# Patient Record
Sex: FEMALE | Race: WHITE | Employment: UNEMPLOYED | URBAN - METROPOLITAN AREA
[De-identification: names, ages, dates, MRNs, and addresses within clinical notes are randomized per-mention and may not be internally consistent; named-entity substitution may affect disease eponyms.]

---

## 2017-02-22 ENCOUNTER — OFFICE VISIT (OUTPATIENT)
Dept: PRIMARY CARE CLINIC | Age: 47
End: 2017-02-22

## 2017-02-22 VITALS
SYSTOLIC BLOOD PRESSURE: 106 MMHG | HEIGHT: 63 IN | WEIGHT: 179 LBS | HEART RATE: 88 BPM | TEMPERATURE: 98.3 F | DIASTOLIC BLOOD PRESSURE: 68 MMHG | RESPIRATION RATE: 12 BRPM | BODY MASS INDEX: 31.71 KG/M2

## 2017-02-22 DIAGNOSIS — R63.5 WEIGHT GAIN: ICD-10-CM

## 2017-02-22 DIAGNOSIS — R63.5 WEIGHT GAIN: Primary | ICD-10-CM

## 2017-02-22 DIAGNOSIS — E78.5 DYSLIPIDEMIA: ICD-10-CM

## 2017-02-22 LAB
T4 FREE: 1.04 NG/DL (ref 0.93–1.7)
T4 TOTAL: 5.9 UG/DL (ref 4.5–11.7)
TSH SERPL DL<=0.05 MIU/L-ACNC: 3.33 UIU/ML (ref 0.27–4.2)

## 2017-02-22 PROCEDURE — G8484 FLU IMMUNIZE NO ADMIN: HCPCS | Performed by: FAMILY MEDICINE

## 2017-02-22 PROCEDURE — G8427 DOCREV CUR MEDS BY ELIG CLIN: HCPCS | Performed by: FAMILY MEDICINE

## 2017-02-22 PROCEDURE — G8417 CALC BMI ABV UP PARAM F/U: HCPCS | Performed by: FAMILY MEDICINE

## 2017-02-22 PROCEDURE — 99213 OFFICE O/P EST LOW 20 MIN: CPT | Performed by: FAMILY MEDICINE

## 2017-02-22 PROCEDURE — 1036F TOBACCO NON-USER: CPT | Performed by: FAMILY MEDICINE

## 2017-02-22 RX ORDER — PHENTERMINE HYDROCHLORIDE 37.5 MG/1
37.5 TABLET ORAL
Qty: 30 TABLET | Refills: 0 | Status: SHIPPED | OUTPATIENT
Start: 2017-02-22 | End: 2017-03-22 | Stop reason: SDUPTHER

## 2017-02-22 ASSESSMENT — ENCOUNTER SYMPTOMS
FACIAL SWELLING: 0
EYE REDNESS: 0
WHEEZING: 0
COLOR CHANGE: 0
CHEST TIGHTNESS: 0
EYE PAIN: 0
PHOTOPHOBIA: 0
CHOKING: 0
APNEA: 0
STRIDOR: 0
CONSTIPATION: 0
DIARRHEA: 0
ABDOMINAL PAIN: 0
NAUSEA: 0
EYE DISCHARGE: 0

## 2017-03-22 ENCOUNTER — OFFICE VISIT (OUTPATIENT)
Dept: PRIMARY CARE CLINIC | Age: 47
End: 2017-03-22

## 2017-03-22 VITALS
RESPIRATION RATE: 12 BRPM | WEIGHT: 172 LBS | HEART RATE: 68 BPM | TEMPERATURE: 98.3 F | DIASTOLIC BLOOD PRESSURE: 72 MMHG | SYSTOLIC BLOOD PRESSURE: 114 MMHG | HEIGHT: 63 IN | BODY MASS INDEX: 30.48 KG/M2

## 2017-03-22 DIAGNOSIS — E78.5 DYSLIPIDEMIA: Primary | ICD-10-CM

## 2017-03-22 DIAGNOSIS — G47.00 INSOMNIA, UNSPECIFIED TYPE: ICD-10-CM

## 2017-03-22 DIAGNOSIS — M79.7 FIBROMYALGIA: Chronic | ICD-10-CM

## 2017-03-22 DIAGNOSIS — R63.5 WEIGHT GAIN: ICD-10-CM

## 2017-03-22 PROCEDURE — G8484 FLU IMMUNIZE NO ADMIN: HCPCS | Performed by: FAMILY MEDICINE

## 2017-03-22 PROCEDURE — G8417 CALC BMI ABV UP PARAM F/U: HCPCS | Performed by: FAMILY MEDICINE

## 2017-03-22 PROCEDURE — 1036F TOBACCO NON-USER: CPT | Performed by: FAMILY MEDICINE

## 2017-03-22 PROCEDURE — G8427 DOCREV CUR MEDS BY ELIG CLIN: HCPCS | Performed by: FAMILY MEDICINE

## 2017-03-22 PROCEDURE — 99213 OFFICE O/P EST LOW 20 MIN: CPT | Performed by: FAMILY MEDICINE

## 2017-03-22 RX ORDER — PHENTERMINE HYDROCHLORIDE 37.5 MG/1
37.5 TABLET ORAL
Qty: 30 TABLET | Refills: 0 | Status: SHIPPED | OUTPATIENT
Start: 2017-03-22 | End: 2017-04-24 | Stop reason: SDUPTHER

## 2017-03-22 ASSESSMENT — ENCOUNTER SYMPTOMS: ABDOMINAL PAIN: 0

## 2017-04-04 ENCOUNTER — TELEPHONE (OUTPATIENT)
Dept: PRIMARY CARE CLINIC | Age: 47
End: 2017-04-04

## 2017-04-24 ENCOUNTER — OFFICE VISIT (OUTPATIENT)
Dept: PRIMARY CARE CLINIC | Age: 47
End: 2017-04-24

## 2017-04-24 VITALS
TEMPERATURE: 97 F | RESPIRATION RATE: 18 BRPM | DIASTOLIC BLOOD PRESSURE: 76 MMHG | SYSTOLIC BLOOD PRESSURE: 122 MMHG | HEART RATE: 90 BPM | OXYGEN SATURATION: 99 % | BODY MASS INDEX: 29.73 KG/M2 | HEIGHT: 63 IN | WEIGHT: 167.8 LBS

## 2017-04-24 DIAGNOSIS — E78.5 DYSLIPIDEMIA: Primary | ICD-10-CM

## 2017-04-24 DIAGNOSIS — R63.5 WEIGHT GAIN: ICD-10-CM

## 2017-04-24 DIAGNOSIS — J30.1 ALLERGIC RHINITIS DUE TO POLLEN, UNSPECIFIED RHINITIS SEASONALITY: ICD-10-CM

## 2017-04-24 PROCEDURE — G8427 DOCREV CUR MEDS BY ELIG CLIN: HCPCS | Performed by: FAMILY MEDICINE

## 2017-04-24 PROCEDURE — G8417 CALC BMI ABV UP PARAM F/U: HCPCS | Performed by: FAMILY MEDICINE

## 2017-04-24 PROCEDURE — 1036F TOBACCO NON-USER: CPT | Performed by: FAMILY MEDICINE

## 2017-04-24 PROCEDURE — 99213 OFFICE O/P EST LOW 20 MIN: CPT | Performed by: FAMILY MEDICINE

## 2017-04-24 RX ORDER — PHENTERMINE HYDROCHLORIDE 37.5 MG/1
37.5 TABLET ORAL
Qty: 30 TABLET | Refills: 0 | Status: SHIPPED | OUTPATIENT
Start: 2017-04-24 | End: 2017-05-24

## 2017-04-24 ASSESSMENT — ENCOUNTER SYMPTOMS
PHOTOPHOBIA: 0
EYE PAIN: 0
NAUSEA: 0
CHEST TIGHTNESS: 0
ABDOMINAL PAIN: 0
CHOKING: 0
WHEEZING: 0
EYE DISCHARGE: 0
DIARRHEA: 0
FACIAL SWELLING: 0
EYE REDNESS: 0
CONSTIPATION: 0
APNEA: 0
STRIDOR: 0
COLOR CHANGE: 0

## 2017-05-23 ENCOUNTER — OFFICE VISIT (OUTPATIENT)
Dept: PRIMARY CARE CLINIC | Age: 47
End: 2017-05-23

## 2017-05-23 VITALS
HEART RATE: 72 BPM | BODY MASS INDEX: 29.06 KG/M2 | WEIGHT: 164 LBS | OXYGEN SATURATION: 98 % | SYSTOLIC BLOOD PRESSURE: 114 MMHG | DIASTOLIC BLOOD PRESSURE: 74 MMHG | TEMPERATURE: 98.3 F | RESPIRATION RATE: 14 BRPM | HEIGHT: 63 IN

## 2017-05-23 DIAGNOSIS — E66.9 OBESITY, UNSPECIFIED OBESITY SEVERITY, UNSPECIFIED OBESITY TYPE: ICD-10-CM

## 2017-05-23 DIAGNOSIS — E78.5 DYSLIPIDEMIA: Primary | ICD-10-CM

## 2017-05-23 PROCEDURE — G8417 CALC BMI ABV UP PARAM F/U: HCPCS | Performed by: FAMILY MEDICINE

## 2017-05-23 PROCEDURE — 1036F TOBACCO NON-USER: CPT | Performed by: FAMILY MEDICINE

## 2017-05-23 PROCEDURE — 99213 OFFICE O/P EST LOW 20 MIN: CPT | Performed by: FAMILY MEDICINE

## 2017-05-23 PROCEDURE — G8427 DOCREV CUR MEDS BY ELIG CLIN: HCPCS | Performed by: FAMILY MEDICINE

## 2017-05-23 RX ORDER — ADAPALENE AND BENZOYL PEROXIDE .1; 2.5 G/100G; G/100G
1 GEL TOPICAL DAILY
Qty: 1 TUBE | Refills: 6 | Status: SHIPPED | OUTPATIENT
Start: 2017-05-23 | End: 2017-08-07

## 2017-05-23 RX ORDER — TOPIRAMATE 50 MG/1
50 TABLET, FILM COATED ORAL DAILY
Qty: 60 TABLET | Refills: 3 | Status: SHIPPED | OUTPATIENT
Start: 2017-05-23 | End: 2017-07-10 | Stop reason: SINTOL

## 2017-05-23 ASSESSMENT — PATIENT HEALTH QUESTIONNAIRE - PHQ9
SUM OF ALL RESPONSES TO PHQ QUESTIONS 1-9: 0
1. LITTLE INTEREST OR PLEASURE IN DOING THINGS: 0
2. FEELING DOWN, DEPRESSED OR HOPELESS: 0
SUM OF ALL RESPONSES TO PHQ9 QUESTIONS 1 & 2: 0

## 2017-07-10 ENCOUNTER — OFFICE VISIT (OUTPATIENT)
Dept: PRIMARY CARE CLINIC | Age: 47
End: 2017-07-10

## 2017-07-10 VITALS
RESPIRATION RATE: 17 BRPM | DIASTOLIC BLOOD PRESSURE: 74 MMHG | WEIGHT: 170 LBS | SYSTOLIC BLOOD PRESSURE: 104 MMHG | BODY MASS INDEX: 30.12 KG/M2 | OXYGEN SATURATION: 98 % | HEIGHT: 63 IN | TEMPERATURE: 97.6 F | HEART RATE: 83 BPM

## 2017-07-10 DIAGNOSIS — E78.5 DYSLIPIDEMIA: ICD-10-CM

## 2017-07-10 DIAGNOSIS — G43.809 OTHER TYPE OF MIGRAINE: Primary | Chronic | ICD-10-CM

## 2017-07-10 DIAGNOSIS — R63.4 WEIGHT LOSS: ICD-10-CM

## 2017-07-10 DIAGNOSIS — T50.905A MEDICATION SIDE EFFECT, INITIAL ENCOUNTER: ICD-10-CM

## 2017-07-10 PROCEDURE — G8417 CALC BMI ABV UP PARAM F/U: HCPCS | Performed by: FAMILY MEDICINE

## 2017-07-10 PROCEDURE — 99213 OFFICE O/P EST LOW 20 MIN: CPT | Performed by: FAMILY MEDICINE

## 2017-07-10 PROCEDURE — G8427 DOCREV CUR MEDS BY ELIG CLIN: HCPCS | Performed by: FAMILY MEDICINE

## 2017-07-10 PROCEDURE — 1036F TOBACCO NON-USER: CPT | Performed by: FAMILY MEDICINE

## 2017-07-10 RX ORDER — LORAZEPAM 0.5 MG/1
0.5 TABLET ORAL
COMMUNITY
End: 2017-07-10

## 2017-07-10 RX ORDER — BUTALBITAL, ACETAMINOPHEN AND CAFFEINE 50; 325; 40 MG/1; MG/1; MG/1
1 TABLET ORAL
COMMUNITY
End: 2017-07-10

## 2017-07-10 ASSESSMENT — ENCOUNTER SYMPTOMS
VOMITING: 0
STRIDOR: 0
SHORTNESS OF BREATH: 1
DIFFICULTY BREATHING: 1
GASTROINTESTINAL NEGATIVE: 1
EYE WATERING: 0
WHEEZING: 0
EYE ITCHING: 0
TROUBLE SWALLOWING: 0
COUGH: 0
EYE REDNESS: 0
PHOTOPHOBIA: 0
DIARRHEA: 0
HYPERVENTILATION: 0
ALLERGIC REACTION: 1
BACK PAIN: 0
EYES NEGATIVE: 1
ABDOMINAL PAIN: 0
GLOBUS SENSATION: 0
CONSTIPATION: 0

## 2017-08-07 ENCOUNTER — OFFICE VISIT (OUTPATIENT)
Dept: PRIMARY CARE CLINIC | Age: 47
End: 2017-08-07

## 2017-08-07 VITALS
TEMPERATURE: 97 F | WEIGHT: 171 LBS | HEART RATE: 76 BPM | RESPIRATION RATE: 12 BRPM | DIASTOLIC BLOOD PRESSURE: 72 MMHG | BODY MASS INDEX: 30.3 KG/M2 | SYSTOLIC BLOOD PRESSURE: 106 MMHG | HEIGHT: 63 IN

## 2017-08-07 DIAGNOSIS — E78.5 DYSLIPIDEMIA: ICD-10-CM

## 2017-08-07 DIAGNOSIS — M79.7 FIBROMYALGIA: Chronic | ICD-10-CM

## 2017-08-07 DIAGNOSIS — R07.9 CHEST PAIN, UNSPECIFIED TYPE: Primary | ICD-10-CM

## 2017-08-07 PROCEDURE — G8427 DOCREV CUR MEDS BY ELIG CLIN: HCPCS | Performed by: FAMILY MEDICINE

## 2017-08-07 PROCEDURE — 93000 ELECTROCARDIOGRAM COMPLETE: CPT | Performed by: FAMILY MEDICINE

## 2017-08-07 PROCEDURE — G8417 CALC BMI ABV UP PARAM F/U: HCPCS | Performed by: FAMILY MEDICINE

## 2017-08-07 PROCEDURE — 1036F TOBACCO NON-USER: CPT | Performed by: FAMILY MEDICINE

## 2017-08-07 PROCEDURE — 99214 OFFICE O/P EST MOD 30 MIN: CPT | Performed by: FAMILY MEDICINE

## 2017-08-07 ASSESSMENT — ENCOUNTER SYMPTOMS
EYE REDNESS: 0
NAUSEA: 0
APNEA: 0
COUGH: 0
HYPERVENTILATION: 0
GLOBUS SENSATION: 1
FACIAL SWELLING: 0
EYE DISCHARGE: 0
VOMITING: 0
STRIDOR: 0
EYE ITCHING: 0
DIARRHEA: 0
SHORTNESS OF BREATH: 1
PHOTOPHOBIA: 0
DIFFICULTY BREATHING: 1
COLOR CHANGE: 0
ABDOMINAL PAIN: 0
WHEEZING: 0
ALLERGIC REACTION: 1
TROUBLE SWALLOWING: 1
EYE PAIN: 0
CHOKING: 0
CONSTIPATION: 0
CHEST TIGHTNESS: 1
EYE WATERING: 0

## 2017-08-10 DIAGNOSIS — R07.89 OTHER CHEST PAIN: Primary | ICD-10-CM

## 2017-12-19 ENCOUNTER — OFFICE VISIT (OUTPATIENT)
Dept: PRIMARY CARE CLINIC | Age: 47
End: 2017-12-19

## 2017-12-19 VITALS
TEMPERATURE: 98.9 F | BODY MASS INDEX: 32.07 KG/M2 | DIASTOLIC BLOOD PRESSURE: 76 MMHG | SYSTOLIC BLOOD PRESSURE: 114 MMHG | HEIGHT: 63 IN | WEIGHT: 181 LBS | HEART RATE: 96 BPM | RESPIRATION RATE: 20 BRPM

## 2017-12-19 DIAGNOSIS — H69.83 DYSFUNCTION OF BOTH EUSTACHIAN TUBES: ICD-10-CM

## 2017-12-19 DIAGNOSIS — J01.00 ACUTE NON-RECURRENT MAXILLARY SINUSITIS: Primary | ICD-10-CM

## 2017-12-19 DIAGNOSIS — R68.83 CHILLS: ICD-10-CM

## 2017-12-19 DIAGNOSIS — J40 BRONCHITIS: ICD-10-CM

## 2017-12-19 LAB
INFLUENZA A ANTIBODY: NORMAL
INFLUENZA B ANTIBODY: NORMAL

## 2017-12-19 PROCEDURE — G8427 DOCREV CUR MEDS BY ELIG CLIN: HCPCS | Performed by: FAMILY MEDICINE

## 2017-12-19 PROCEDURE — 99213 OFFICE O/P EST LOW 20 MIN: CPT | Performed by: FAMILY MEDICINE

## 2017-12-19 PROCEDURE — G8417 CALC BMI ABV UP PARAM F/U: HCPCS | Performed by: FAMILY MEDICINE

## 2017-12-19 PROCEDURE — G8484 FLU IMMUNIZE NO ADMIN: HCPCS | Performed by: FAMILY MEDICINE

## 2017-12-19 PROCEDURE — 87804 INFLUENZA ASSAY W/OPTIC: CPT | Performed by: FAMILY MEDICINE

## 2017-12-19 PROCEDURE — 1036F TOBACCO NON-USER: CPT | Performed by: FAMILY MEDICINE

## 2017-12-19 RX ORDER — AZITHROMYCIN 250 MG/1
TABLET, FILM COATED ORAL
Qty: 1 PACKET | Refills: 1 | Status: SHIPPED | OUTPATIENT
Start: 2017-12-19 | End: 2017-12-29

## 2017-12-19 RX ORDER — CEFUROXIME AXETIL 500 MG/1
500 TABLET ORAL 2 TIMES DAILY
Qty: 20 TABLET | Refills: 0 | Status: CANCELLED | OUTPATIENT
Start: 2017-12-19 | End: 2017-12-29

## 2017-12-19 ASSESSMENT — ENCOUNTER SYMPTOMS
SORE THROAT: 1
COUGH: 1
SHORTNESS OF BREATH: 1
RHINORRHEA: 1
HEMOPTYSIS: 0
DIARRHEA: 0
ABDOMINAL PAIN: 0
WHEEZING: 1
PHOTOPHOBIA: 0
EYES NEGATIVE: 1
CONSTIPATION: 0
GASTROINTESTINAL NEGATIVE: 1
HEARTBURN: 0
EYE ITCHING: 0
BACK PAIN: 0
EYE REDNESS: 0

## 2018-01-11 ENCOUNTER — OFFICE VISIT (OUTPATIENT)
Dept: PRIMARY CARE CLINIC | Age: 48
End: 2018-01-11

## 2018-01-11 VITALS
OXYGEN SATURATION: 96 % | HEART RATE: 89 BPM | SYSTOLIC BLOOD PRESSURE: 114 MMHG | BODY MASS INDEX: 32.2 KG/M2 | WEIGHT: 181.7 LBS | DIASTOLIC BLOOD PRESSURE: 70 MMHG | TEMPERATURE: 99 F | HEIGHT: 63 IN | RESPIRATION RATE: 14 BRPM

## 2018-01-11 DIAGNOSIS — J00 OTHER ACUTE RHINITIS: ICD-10-CM

## 2018-01-11 DIAGNOSIS — J20.9 ACUTE BRONCHITIS, UNSPECIFIED ORGANISM: Primary | ICD-10-CM

## 2018-01-11 PROCEDURE — 99213 OFFICE O/P EST LOW 20 MIN: CPT | Performed by: INTERNAL MEDICINE

## 2018-01-11 RX ORDER — DOXYCYCLINE HYCLATE 100 MG
100 TABLET ORAL 2 TIMES DAILY
Qty: 20 TABLET | Refills: 0 | Status: SHIPPED | OUTPATIENT
Start: 2018-01-11 | End: 2018-01-21

## 2018-01-11 RX ORDER — FEXOFENADINE HCL AND PSEUDOEPHEDRINE HCI 60; 120 MG/1; MG/1
1 TABLET, EXTENDED RELEASE ORAL 2 TIMES DAILY
Qty: 60 TABLET | Refills: 1 | Status: SHIPPED | OUTPATIENT
Start: 2018-01-11

## 2018-01-11 RX ORDER — PROMETHAZINE HYDROCHLORIDE AND CODEINE PHOSPHATE 6.25; 1 MG/5ML; MG/5ML
5 SYRUP ORAL EVERY 4 HOURS PRN
Qty: 118 ML | Refills: 0 | Status: SHIPPED | OUTPATIENT
Start: 2018-01-11 | End: 2018-01-18

## 2018-01-11 RX ORDER — IPRATROPIUM BROMIDE 42 UG/1
2 SPRAY, METERED NASAL 3 TIMES DAILY
Qty: 1 BOTTLE | Refills: 2 | Status: SHIPPED | OUTPATIENT
Start: 2018-01-11 | End: 2019-01-11

## 2018-01-16 ASSESSMENT — ENCOUNTER SYMPTOMS
FACIAL SWELLING: 0
PHOTOPHOBIA: 0
RHINORRHEA: 1
APNEA: 0
HEARTBURN: 0
CHOKING: 0
BLOOD IN STOOL: 0
ABDOMINAL DISTENTION: 0
COUGH: 1
SORE THROAT: 1

## 2018-03-12 ENCOUNTER — HOSPITAL ENCOUNTER (OUTPATIENT)
Dept: MRI IMAGING | Age: 48
Discharge: HOME | End: 2018-03-14
Payer: COMMERCIAL

## 2018-03-12 DIAGNOSIS — M51.26 HNP (HERNIATED NUCLEUS PULPOSUS), LUMBAR: ICD-10-CM

## 2018-03-12 DIAGNOSIS — M47.817 LUMBOSACRAL SPONDYLOSIS WITHOUT MYELOPATHY: ICD-10-CM

## 2018-03-12 PROCEDURE — 72148 MRI LUMBAR SPINE W/O DYE: CPT

## 2018-03-28 ENCOUNTER — OFFICE VISIT (OUTPATIENT)
Dept: PRIMARY CARE CLINIC | Age: 48
End: 2018-03-28
Payer: COMMERCIAL

## 2018-03-28 VITALS
SYSTOLIC BLOOD PRESSURE: 102 MMHG | DIASTOLIC BLOOD PRESSURE: 72 MMHG | RESPIRATION RATE: 16 BRPM | OXYGEN SATURATION: 99 % | BODY MASS INDEX: 32.43 KG/M2 | HEIGHT: 63 IN | WEIGHT: 183 LBS | HEART RATE: 100 BPM | TEMPERATURE: 97.8 F

## 2018-03-28 DIAGNOSIS — R68.89 FLU-LIKE SYMPTOMS: ICD-10-CM

## 2018-03-28 DIAGNOSIS — J01.00 ACUTE NON-RECURRENT MAXILLARY SINUSITIS: Primary | ICD-10-CM

## 2018-03-28 DIAGNOSIS — J02.9 SORE THROAT: ICD-10-CM

## 2018-03-28 LAB
INFLUENZA A ANTIBODY: NORMAL
INFLUENZA B ANTIBODY: NORMAL
S PYO AG THROAT QL: NORMAL

## 2018-03-28 PROCEDURE — 87880 STREP A ASSAY W/OPTIC: CPT | Performed by: PHYSICIAN ASSISTANT

## 2018-03-28 PROCEDURE — 87804 INFLUENZA ASSAY W/OPTIC: CPT | Performed by: PHYSICIAN ASSISTANT

## 2018-03-28 PROCEDURE — 99213 OFFICE O/P EST LOW 20 MIN: CPT | Performed by: PHYSICIAN ASSISTANT

## 2018-03-28 RX ORDER — AZITHROMYCIN 250 MG/1
TABLET, FILM COATED ORAL
Qty: 1 PACKET | Refills: 0 | Status: SHIPPED | OUTPATIENT
Start: 2018-03-28 | End: 2018-05-20

## 2018-03-28 ASSESSMENT — ENCOUNTER SYMPTOMS
SWOLLEN GLANDS: 0
NAUSEA: 0
COUGH: 0
SORE THROAT: 1

## 2018-03-28 NOTE — PROGRESS NOTES
Subjective     Denisse Ferrer 49 y.o. female presents 3/28/18 with   Chief Complaint   Patient presents with    Nasal Congestion     Pt is here for nasal congestion, sore throat, glands swollen, slight fever ( 99 deg F), chills, body aches, right ear pain x 1 day       Pharyngitis   This is a new problem. The current episode started in the past 7 days. The problem occurs constantly. The problem has been unchanged. Associated symptoms include a fever, myalgias and a sore throat. Pertinent negatives include no chills, congestion, coughing, diaphoresis, fatigue, headaches, joint swelling, nausea, neck pain, rash or swollen glands. Nothing aggravates the symptoms. Treatments tried: Theraflu, Sudafed. The treatment provided no relief.        Reviewed the following history:    Past Medical History:   Diagnosis Date    Allergic rhinitis 2012    Allergic rhinitis 2012    Anxiety 3339    Biliary colic     Bulging discs     L4-5    Cellulitis 2012    Dyslipidemia 2017    ETD (eustachian tube dysfunction) 2012    Fibromyalgia     Gallbladder attack, GB Thickening 2015    GERD (gastroesophageal reflux disease)     HNP (herniated nucleus pulposus), lumbar 2012    LBP (low back pain) 2012    LBP (low back pain) 2012    Leg wound, left 2012    Lumbar radiculopathy 2012    Lumbar radiculopathy 2012    Migraine     OCD (obsessive compulsive disorder)     PMDD (premenstrual dysphoric disorder)     Sinusitis 2014     Past Surgical History:   Procedure Laterality Date    CARDIAC CATHETERIZATION      normal     SECTION  9530,7740    SHOULDER SURGERY Left     X 3    TONSILLECTOMY      TUBAL LIGATION      REVERSED     TUBAL LIGATION      2009    UPPER GASTROINTESTINAL ENDOSCOPY  2012     Family History   Problem Relation Age of Onset    Heart Disease Father     High Blood Pressure Father     High Blood  POCT Influenza A/B    POCT rapid strep A       Orders Placed This Encounter   Medications    azithromycin (ZITHROMAX) 250 MG tablet     Sig: Take 2 tabs (500 mg) on Day 1, and take 1 tab (250 mg) on days 2 through 5. Dispense:  1 packet     Refill:  0       Reviewed with the patient: current clinical status, medications, activities and diet. Side effects, adverse effects of the medication prescribed today, as well as treatment plan and result expectations have been discussed with the patient who expresses understanding and desires to proceed. Close follow up to evaluate treatment results and for coordination of care. I have reviewed the patient's medical history in detail and updated the computerized patient record. Return if symptoms worsen or fail to improve, for follow up with PCP.     YEISON Nguyen

## 2018-05-20 ENCOUNTER — OFFICE VISIT (OUTPATIENT)
Dept: PRIMARY CARE CLINIC | Age: 48
End: 2018-05-20
Payer: COMMERCIAL

## 2018-05-20 VITALS
DIASTOLIC BLOOD PRESSURE: 78 MMHG | SYSTOLIC BLOOD PRESSURE: 118 MMHG | WEIGHT: 181.8 LBS | TEMPERATURE: 98.4 F | OXYGEN SATURATION: 98 % | HEIGHT: 63 IN | BODY MASS INDEX: 32.21 KG/M2 | RESPIRATION RATE: 16 BRPM | HEART RATE: 101 BPM

## 2018-05-20 DIAGNOSIS — R10.12 LEFT UPPER QUADRANT PAIN: Primary | ICD-10-CM

## 2018-05-20 PROCEDURE — 99211 OFF/OP EST MAY X REQ PHY/QHP: CPT | Performed by: NURSE PRACTITIONER

## 2018-05-20 ASSESSMENT — ENCOUNTER SYMPTOMS
FLATUS: 0
NAUSEA: 0
BELCHING: 0
HEMATOCHEZIA: 0
ABDOMINAL PAIN: 1
CONSTIPATION: 0
DIARRHEA: 0
VOMITING: 0

## 2018-05-21 ENCOUNTER — OFFICE VISIT (OUTPATIENT)
Dept: PRIMARY CARE CLINIC | Age: 48
End: 2018-05-21
Payer: COMMERCIAL

## 2018-05-21 VITALS
HEIGHT: 63 IN | BODY MASS INDEX: 32.21 KG/M2 | TEMPERATURE: 98.3 F | RESPIRATION RATE: 16 BRPM | OXYGEN SATURATION: 98 % | HEART RATE: 91 BPM | SYSTOLIC BLOOD PRESSURE: 122 MMHG | WEIGHT: 181.8 LBS | DIASTOLIC BLOOD PRESSURE: 80 MMHG

## 2018-05-21 DIAGNOSIS — G62.9 NEUROPATHY: ICD-10-CM

## 2018-05-21 DIAGNOSIS — B02.9 HERPES ZOSTER WITHOUT COMPLICATION: ICD-10-CM

## 2018-05-21 DIAGNOSIS — R20.8 SENSORY ABNORMALITY OF THORACIC DERMATOME DISTRIBUTION: Primary | ICD-10-CM

## 2018-05-21 DIAGNOSIS — F43.9 STRESS: ICD-10-CM

## 2018-05-21 DIAGNOSIS — R10.12 ABDOMINAL PAIN, LEFT UPPER QUADRANT: ICD-10-CM

## 2018-05-21 PROCEDURE — 99214 OFFICE O/P EST MOD 30 MIN: CPT | Performed by: FAMILY MEDICINE

## 2018-05-21 RX ORDER — PREDNISONE 10 MG/1
TABLET ORAL
Qty: 20 TABLET | Refills: 0 | Status: SHIPPED | OUTPATIENT
Start: 2018-05-21 | End: 2018-05-31

## 2018-05-21 RX ORDER — VALACYCLOVIR HYDROCHLORIDE 1 G/1
1000 TABLET, FILM COATED ORAL 3 TIMES DAILY
Qty: 30 TABLET | Refills: 0 | Status: SHIPPED | OUTPATIENT
Start: 2018-05-21 | End: 2018-10-01 | Stop reason: ALTCHOICE

## 2018-05-21 ASSESSMENT — ENCOUNTER SYMPTOMS
HEMATOCHEZIA: 0
PHOTOPHOBIA: 0
EYE DISCHARGE: 0
CONSTIPATION: 0
ABDOMINAL PAIN: 0
ROS SKIN COMMENTS: SENSITIVITY
EYE PAIN: 0
BELCHING: 0
CHEST TIGHTNESS: 0
APNEA: 0
FACIAL SWELLING: 0
CHOKING: 0
NAUSEA: 0
FLATUS: 0
EYE REDNESS: 0
BACK PAIN: 1
COLOR CHANGE: 1
STRIDOR: 0
DIARRHEA: 0
WHEEZING: 0

## 2018-05-31 ENCOUNTER — OFFICE VISIT (OUTPATIENT)
Dept: PRIMARY CARE CLINIC | Age: 48
End: 2018-05-31
Payer: COMMERCIAL

## 2018-05-31 VITALS
TEMPERATURE: 99.2 F | SYSTOLIC BLOOD PRESSURE: 124 MMHG | WEIGHT: 180.6 LBS | OXYGEN SATURATION: 99 % | DIASTOLIC BLOOD PRESSURE: 80 MMHG | BODY MASS INDEX: 32 KG/M2 | HEART RATE: 88 BPM | RESPIRATION RATE: 16 BRPM | HEIGHT: 63 IN

## 2018-05-31 DIAGNOSIS — J02.9 SORE THROAT: Primary | ICD-10-CM

## 2018-05-31 LAB — S PYO AG THROAT QL: NORMAL

## 2018-05-31 PROCEDURE — 87880 STREP A ASSAY W/OPTIC: CPT | Performed by: INTERNAL MEDICINE

## 2018-05-31 PROCEDURE — 99213 OFFICE O/P EST LOW 20 MIN: CPT | Performed by: INTERNAL MEDICINE

## 2018-05-31 RX ORDER — AZITHROMYCIN 250 MG/1
TABLET, FILM COATED ORAL
Qty: 1 PACKET | Refills: 1 | Status: SHIPPED | OUTPATIENT
Start: 2018-05-31 | End: 2018-09-09

## 2018-05-31 ASSESSMENT — PATIENT HEALTH QUESTIONNAIRE - PHQ9
SUM OF ALL RESPONSES TO PHQ9 QUESTIONS 1 & 2: 2
SUM OF ALL RESPONSES TO PHQ QUESTIONS 1-9: 2
2. FEELING DOWN, DEPRESSED OR HOPELESS: 1
1. LITTLE INTEREST OR PLEASURE IN DOING THINGS: 1

## 2018-06-05 ASSESSMENT — ENCOUNTER SYMPTOMS
BLOOD IN STOOL: 0
FACIAL SWELLING: 0
ABDOMINAL DISTENTION: 0
CHOKING: 0
COUGH: 0
APNEA: 0
PHOTOPHOBIA: 0
ABDOMINAL PAIN: 0

## 2018-09-09 ENCOUNTER — OFFICE VISIT (OUTPATIENT)
Dept: PRIMARY CARE CLINIC | Age: 48
End: 2018-09-09
Payer: COMMERCIAL

## 2018-09-09 VITALS
BODY MASS INDEX: 32.07 KG/M2 | HEART RATE: 87 BPM | RESPIRATION RATE: 14 BRPM | SYSTOLIC BLOOD PRESSURE: 124 MMHG | TEMPERATURE: 98.5 F | WEIGHT: 181 LBS | OXYGEN SATURATION: 97 % | HEIGHT: 63 IN | DIASTOLIC BLOOD PRESSURE: 82 MMHG

## 2018-09-09 DIAGNOSIS — H60.331 ACUTE SWIMMER'S EAR OF RIGHT SIDE: ICD-10-CM

## 2018-09-09 DIAGNOSIS — H65.92 MIDDLE EAR EFFUSION, LEFT: ICD-10-CM

## 2018-09-09 DIAGNOSIS — H66.001 ACUTE SUPPURATIVE OTITIS MEDIA OF RIGHT EAR WITHOUT SPONTANEOUS RUPTURE OF TYMPANIC MEMBRANE, RECURRENCE NOT SPECIFIED: Primary | ICD-10-CM

## 2018-09-09 PROCEDURE — 99213 OFFICE O/P EST LOW 20 MIN: CPT | Performed by: NURSE PRACTITIONER

## 2018-09-09 RX ORDER — AMOXICILLIN AND CLAVULANATE POTASSIUM 875; 125 MG/1; MG/1
1 TABLET, FILM COATED ORAL 2 TIMES DAILY
Qty: 20 TABLET | Refills: 0 | Status: SHIPPED | OUTPATIENT
Start: 2018-09-09 | End: 2018-09-19

## 2018-09-09 ASSESSMENT — ENCOUNTER SYMPTOMS
SORE THROAT: 0
COUGH: 0
ABDOMINAL PAIN: 0
RHINORRHEA: 0
SHORTNESS OF BREATH: 0

## 2018-09-09 NOTE — PROGRESS NOTES
Subjective:      Patient ID: Marcella Crowell is a 51 y.o. female who presents today for:  Chief Complaint   Patient presents with    Otalgia     Patient is here for right ear pain with jaw pain x 1 day. She was splashed with [facility] water over Labor day. Tylenol is not working. Otalgia    There is pain in the right ear. This is a new problem. The current episode started yesterday. The problem occurs constantly. The problem has been unchanged. There has been no fever. Associated symptoms include ear discharge. Pertinent negatives include no abdominal pain, coughing, headaches, hearing loss, neck pain, rhinorrhea or sore throat. Associated symptoms comments: Right jaw, ear tender  . She has tried acetaminophen (flonase) for the symptoms. The treatment provided no relief. Her past medical history is significant for a chronic ear infection. There is no history of hearing loss or a tympanostomy tube.        Past Medical History:   Diagnosis Date    Allergic rhinitis 2012    Allergic rhinitis 2012    Anxiety     Biliary colic 7813    Bulging discs     L4-5    Cellulitis 2012    Dyslipidemia 2017    ETD (eustachian tube dysfunction) 2012    Fibromyalgia     Gallbladder attack, GB Thickening 2015    GERD (gastroesophageal reflux disease)     HNP (herniated nucleus pulposus), lumbar 2012    LBP (low back pain) 2012    LBP (low back pain) 2012    Leg wound, left 2012    Lumbar radiculopathy 2012    Lumbar radiculopathy 2012    Migraine     OCD (obsessive compulsive disorder)     PMDD (premenstrual dysphoric disorder)     Sinusitis 2014     Past Surgical History:   Procedure Laterality Date    CARDIAC CATHETERIZATION      normal     SECTION  2443,3922    SHOULDER SURGERY Left     X 3    TONSILLECTOMY      TUBAL LIGATION      REVERSED     TUBAL LIGATION          UPPER GASTROINTESTINAL

## 2018-09-09 NOTE — PATIENT INSTRUCTIONS
closely for changes in your health, and be sure to contact your doctor if:    · You have new or worse symptoms.     · You are not getting better after taking an antibiotic for 2 days. Where can you learn more? Go to [email or web address] and sign in to your [employer] account. Enter O830 in the Healthiest You box to learn more about \"Ear Infection (Otitis Media): Care Instructions. \"     If you do not have an account, please click on the \"Sign Up Now\" link. Current as of: May 12, 2017  Content Version: 11.7  © 7023-2369 [employer]. Care instructions adapted under license by [facility] ([facility]). If you have questions about a medical condition or this instruction, always ask your healthcare professional. [address] any warranty or liability for your use of this information.

## 2018-10-01 ENCOUNTER — OFFICE VISIT (OUTPATIENT)
Dept: PRIMARY CARE CLINIC | Age: 48
End: 2018-10-01
Payer: COMMERCIAL

## 2018-10-01 VITALS
DIASTOLIC BLOOD PRESSURE: 72 MMHG | BODY MASS INDEX: 31.71 KG/M2 | OXYGEN SATURATION: 98 % | HEART RATE: 110 BPM | WEIGHT: 179 LBS | HEIGHT: 63 IN | SYSTOLIC BLOOD PRESSURE: 118 MMHG | TEMPERATURE: 98.6 F | RESPIRATION RATE: 18 BRPM

## 2018-10-01 DIAGNOSIS — K21.9 GASTROESOPHAGEAL REFLUX DISEASE WITHOUT ESOPHAGITIS: Chronic | ICD-10-CM

## 2018-10-01 DIAGNOSIS — J01.00 ACUTE NON-RECURRENT MAXILLARY SINUSITIS: Primary | ICD-10-CM

## 2018-10-01 PROCEDURE — 99214 OFFICE O/P EST MOD 30 MIN: CPT | Performed by: FAMILY MEDICINE

## 2018-10-01 RX ORDER — AZITHROMYCIN 250 MG/1
TABLET, FILM COATED ORAL
Qty: 1 PACKET | Refills: 1 | Status: SHIPPED | OUTPATIENT
Start: 2018-10-01 | End: 2018-12-26

## 2018-10-01 RX ORDER — FLUTICASONE PROPIONATE 50 MCG
1 SPRAY, SUSPENSION (ML) NASAL DAILY
Qty: 1 BOTTLE | Refills: 1 | Status: SHIPPED | OUTPATIENT
Start: 2018-10-01 | End: 2019-02-10

## 2018-10-01 ASSESSMENT — ENCOUNTER SYMPTOMS
SHORTNESS OF BREATH: 0
WHEEZING: 0
HOARSE VOICE: 0
COUGH: 1
EYE ITCHING: 0
EYE REDNESS: 0
SORE THROAT: 0
BACK PAIN: 0
DIARRHEA: 0
SINUS PRESSURE: 1
EYES NEGATIVE: 1
SWOLLEN GLANDS: 0
CONSTIPATION: 0
ABDOMINAL PAIN: 0
GASTROINTESTINAL NEGATIVE: 1
PHOTOPHOBIA: 0

## 2018-10-24 ENCOUNTER — NURSE ONLY (OUTPATIENT)
Dept: PRIMARY CARE CLINIC | Age: 48
End: 2018-10-24
Payer: COMMERCIAL

## 2018-10-24 DIAGNOSIS — Z23 NEED FOR INFLUENZA VACCINATION: Primary | ICD-10-CM

## 2018-10-24 PROCEDURE — 90688 IIV4 VACCINE SPLT 0.5 ML IM: CPT | Performed by: PHYSICIAN ASSISTANT

## 2018-10-24 PROCEDURE — 90471 IMMUNIZATION ADMIN: CPT | Performed by: PHYSICIAN ASSISTANT

## 2018-12-26 ENCOUNTER — OFFICE VISIT (OUTPATIENT)
Dept: PRIMARY CARE CLINIC | Age: 48
End: 2018-12-26
Payer: COMMERCIAL

## 2018-12-26 VITALS
HEART RATE: 77 BPM | OXYGEN SATURATION: 98 % | WEIGHT: 174 LBS | TEMPERATURE: 97.5 F | RESPIRATION RATE: 16 BRPM | SYSTOLIC BLOOD PRESSURE: 124 MMHG | DIASTOLIC BLOOD PRESSURE: 82 MMHG | BODY MASS INDEX: 30.83 KG/M2 | HEIGHT: 63 IN

## 2018-12-26 DIAGNOSIS — G43.809 OTHER MIGRAINE WITHOUT STATUS MIGRAINOSUS, NOT INTRACTABLE: ICD-10-CM

## 2018-12-26 DIAGNOSIS — J06.9 ACUTE URI: Primary | ICD-10-CM

## 2018-12-26 DIAGNOSIS — R05.9 COUGH: ICD-10-CM

## 2018-12-26 PROCEDURE — 99213 OFFICE O/P EST LOW 20 MIN: CPT | Performed by: PHYSICIAN ASSISTANT

## 2018-12-26 RX ORDER — AZITHROMYCIN 250 MG/1
TABLET, FILM COATED ORAL
Qty: 1 PACKET | Refills: 0 | Status: SHIPPED | OUTPATIENT
Start: 2018-12-26 | End: 2019-02-10 | Stop reason: ALTCHOICE

## 2018-12-26 RX ORDER — BUTALBITAL, ACETAMINOPHEN AND CAFFEINE 50; 325; 40 MG/1; MG/1; MG/1
1-2 TABLET ORAL EVERY 6 HOURS PRN
Qty: 30 TABLET | Refills: 3 | Status: SHIPPED | OUTPATIENT
Start: 2018-12-26

## 2018-12-26 ASSESSMENT — ENCOUNTER SYMPTOMS
SORE THROAT: 1
COUGH: 1
SHORTNESS OF BREATH: 0
HOARSE VOICE: 0
SINUS PRESSURE: 1
SWOLLEN GLANDS: 0

## 2018-12-26 NOTE — PROGRESS NOTES
Neurological: Negative for headaches. Objective    Vitals:    12/26/18 1737   BP: 124/82   Pulse: 77   Resp: 16   Temp: 97.5 °F (36.4 °C)   TempSrc: Tympanic   SpO2: 98%   Weight: 174 lb (78.9 kg)   Height: 5' 3\" (1.6 m)       Physical Exam   Constitutional: She appears well-developed and well-nourished. No distress. HENT:   Head: Normocephalic and atraumatic. Right Ear: A middle ear effusion (clear fluid) is present. Left Ear: A middle ear effusion (clear fluid) is present. Nose: Mucosal edema and rhinorrhea present. Mouth/Throat: Posterior oropharyngeal erythema present. No oropharyngeal exudate. Eyes: Conjunctivae are normal. Right eye exhibits no discharge. Left eye exhibits no discharge. No scleral icterus. Cardiovascular: Normal rate, regular rhythm and normal heart sounds. Exam reveals no gallop and no friction rub. No murmur heard. Pulmonary/Chest: Breath sounds normal. No stridor. No respiratory distress. She has no wheezes. She has no rales. She exhibits no tenderness. Lymphadenopathy:     She has no cervical adenopathy. Skin: Skin is warm and dry. No rash noted. She is not diaphoretic. No erythema. No pallor. Vitals reviewed. Assessment and Plan      ICD-10-CM    1. Acute URI J06.9 azithromycin (ZITHROMAX) 250 MG tablet   2. Cough R05    3. Other migraine without status migrainosus, not intractable G43.809 butalbital-acetaminophen-caffeine (FIORICET, ESGIC) -40 MG per tablet       No orders of the defined types were placed in this encounter. Orders Placed This Encounter   Medications    azithromycin (ZITHROMAX) 250 MG tablet     Sig: Take 2 tabs (500 mg) on Day 1, and take 1 tab (250 mg) on days 2 through 5.      Dispense:  1 packet     Refill:  0    butalbital-acetaminophen-caffeine (FIORICET, ESGIC) -40 MG per tablet     Sig: Take 1-2 tablets by mouth every 6 hours as needed for Headaches     Dispense:  30 tablet     Refill:  3     Fioricet was

## 2019-02-10 ENCOUNTER — OFFICE VISIT (OUTPATIENT)
Dept: PRIMARY CARE CLINIC | Age: 49
End: 2019-02-10
Payer: COMMERCIAL

## 2019-02-10 VITALS
RESPIRATION RATE: 14 BRPM | DIASTOLIC BLOOD PRESSURE: 78 MMHG | TEMPERATURE: 97.4 F | HEIGHT: 63 IN | SYSTOLIC BLOOD PRESSURE: 112 MMHG | BODY MASS INDEX: 30.99 KG/M2 | OXYGEN SATURATION: 99 % | HEART RATE: 91 BPM | WEIGHT: 174.9 LBS

## 2019-02-10 DIAGNOSIS — J01.00 ACUTE NON-RECURRENT MAXILLARY SINUSITIS: Primary | ICD-10-CM

## 2019-02-10 PROCEDURE — 99213 OFFICE O/P EST LOW 20 MIN: CPT | Performed by: PHYSICIAN ASSISTANT

## 2019-02-10 RX ORDER — FLUTICASONE PROPIONATE 50 MCG
2 SPRAY, SUSPENSION (ML) NASAL NIGHTLY
Qty: 1 BOTTLE | Refills: 3 | Status: SHIPPED | OUTPATIENT
Start: 2019-02-10

## 2019-02-10 RX ORDER — AMOXICILLIN AND CLAVULANATE POTASSIUM 875; 125 MG/1; MG/1
1 TABLET, FILM COATED ORAL 2 TIMES DAILY
Qty: 20 TABLET | Refills: 0 | Status: SHIPPED | OUTPATIENT
Start: 2019-02-10 | End: 2019-02-20

## 2019-02-10 ASSESSMENT — ENCOUNTER SYMPTOMS
SORE THROAT: 0
SHORTNESS OF BREATH: 0
COUGH: 1
SWOLLEN GLANDS: 0
SINUS PRESSURE: 1
HOARSE VOICE: 0

## 2019-05-07 ENCOUNTER — TELEPHONE (OUTPATIENT)
Dept: PRIMARY CARE CLINIC | Age: 49
End: 2019-05-07

## 2021-07-07 ENCOUNTER — HOSPITAL ENCOUNTER (OUTPATIENT)
Dept: WOMENS IMAGING | Age: 51
Discharge: HOME | End: 2021-07-09
Payer: COMMERCIAL

## 2021-07-07 DIAGNOSIS — Z12.31 ENCOUNTER FOR SCREENING MAMMOGRAM FOR BREAST CANCER: ICD-10-CM

## 2021-07-07 PROCEDURE — 77063 BREAST TOMOSYNTHESIS BI: CPT

## 2023-03-30 ENCOUNTER — OFFICE VISIT (OUTPATIENT)
Dept: PRIMARY CARE | Facility: CLINIC | Age: 53
End: 2023-03-30
Payer: COMMERCIAL

## 2023-03-30 VITALS
RESPIRATION RATE: 16 BRPM | OXYGEN SATURATION: 98 % | SYSTOLIC BLOOD PRESSURE: 122 MMHG | HEART RATE: 68 BPM | HEIGHT: 63 IN | DIASTOLIC BLOOD PRESSURE: 64 MMHG | WEIGHT: 178 LBS | BODY MASS INDEX: 31.54 KG/M2 | TEMPERATURE: 98 F

## 2023-03-30 DIAGNOSIS — R05.8 RECURRENT PRODUCTIVE COUGH: ICD-10-CM

## 2023-03-30 DIAGNOSIS — J02.9 SORE THROAT: Primary | ICD-10-CM

## 2023-03-30 LAB — POC RAPID STREP: NEGATIVE

## 2023-03-30 PROCEDURE — 99214 OFFICE O/P EST MOD 30 MIN: CPT | Performed by: NURSE PRACTITIONER

## 2023-03-30 PROCEDURE — 87880 STREP A ASSAY W/OPTIC: CPT | Performed by: NURSE PRACTITIONER

## 2023-03-30 RX ORDER — ROSUVASTATIN CALCIUM 20 MG/1
20 TABLET, COATED ORAL DAILY
COMMUNITY
End: 2023-09-05

## 2023-03-30 RX ORDER — BUPROPION HYDROCHLORIDE 300 MG/1
300 TABLET ORAL DAILY
COMMUNITY
End: 2023-08-11 | Stop reason: SDUPTHER

## 2023-03-30 RX ORDER — CEFDINIR 300 MG/1
300 CAPSULE ORAL 2 TIMES DAILY
Qty: 20 CAPSULE | Refills: 0 | Status: SHIPPED | OUTPATIENT
Start: 2023-03-30 | End: 2023-03-30

## 2023-03-30 RX ORDER — SERTRALINE HYDROCHLORIDE 100 MG/1
100 TABLET, FILM COATED ORAL DAILY
COMMUNITY
End: 2023-04-18

## 2023-03-30 RX ORDER — DOXYCYCLINE 100 MG/1
100 CAPSULE ORAL 2 TIMES DAILY
Qty: 20 CAPSULE | Refills: 0 | Status: SHIPPED | OUTPATIENT
Start: 2023-03-30 | End: 2023-04-09

## 2023-03-30 RX ORDER — BUPROPION HYDROCHLORIDE 150 MG/1
150 TABLET ORAL DAILY
COMMUNITY
End: 2023-05-30

## 2023-03-30 RX ORDER — CODEINE PHOSPHATE AND GUAIFENESIN 10; 100 MG/5ML; MG/5ML
5 SOLUTION ORAL EVERY 8 HOURS PRN
Qty: 105 ML | Refills: 0 | Status: SHIPPED | OUTPATIENT
Start: 2023-03-30 | End: 2023-04-06

## 2023-03-30 RX ORDER — TRAZODONE HYDROCHLORIDE 50 MG/1
50 TABLET ORAL NIGHTLY PRN
COMMUNITY
End: 2023-06-20 | Stop reason: SDUPTHER

## 2023-03-30 RX ORDER — ASCORBIC ACID 500 MG
1 TABLET ORAL DAILY
COMMUNITY
Start: 2021-01-20

## 2023-03-30 RX ORDER — BUTALBITAL, ACETAMINOPHEN AND CAFFEINE 50; 325; 40 MG/1; MG/1; MG/1
1 TABLET ORAL EVERY 6 HOURS PRN
COMMUNITY
Start: 2014-05-05

## 2023-03-30 ASSESSMENT — ENCOUNTER SYMPTOMS
ACTIVITY CHANGE: 0
SORE THROAT: 1
COUGH: 1

## 2023-03-30 NOTE — PATIENT INSTRUCTIONS
Patient presents for ongoing symptoms of sore throat and recurrent productive cough     Rapid strep negative -   Recurrent productive cough symptoms   Patient to start antihistamine and flonase   Discussed using guaifenesin AC    Discussed if not improving after using the above pt can start cefdinir BID   If not improved follow up with PCP .    all questions answered  follow up in office if signs or symptoms are worsening, not improving  or  please schedule follow up with PCP   if shortness of breath and or chest pain seek emergency department   24 hour hotline telephone numbers  Suicide or mental health mobile crisis help line [phone]  Child Abuse or neglect [phone]  Elder Abuse or neglect [phone]  Rape Crisis [phone]  Domestic Violence [phone]  Narcotics Anonymous [phone]

## 2023-03-30 NOTE — PROGRESS NOTES
"Subjective   Patient ID: Louise Sanford is a 52 y.o. female who presents for Sore Throat and Cough (PT states she has been not feeling well for three months now coughing and on /off sore throat).    HPI   Sore Throat and Cough (PT states she has been not feeling well for three months now coughing and on /off sore throat).  Pt reports the coughing and dark green mucus coming out has been a lot pt reports also sore throat as well. Pt denies fever or chills. Pt reports at night the throat gets dry and then will cough up green phlegm pt reports a little wheezing as well. Pt has allergies denies asthma pt reports a little sob pt does get a winded at times patient was seen by me on 1/27/2023 and went to urgent care 3/1/2023 and was prescribed prednisone and amoxicillin which didn't help. Pt reports the throat is very bad like strep throat. Pt has pain with swallowing         Review of Systems   Constitutional:  Negative for activity change.   HENT:  Positive for sore throat.    Respiratory:  Positive for cough.        Objective   /64 (BP Location: Right arm, Patient Position: Sitting, BP Cuff Size: Large adult)   Pulse 68   Temp 36.7 °C (98 °F)   Resp 16   Ht 1.6 m (5' 3\")   Wt 80.7 kg (178 lb)   SpO2 98%   BMI 31.53 kg/m²     Physical Exam  Constitutional:       Appearance: Normal appearance.   HENT:      Mouth/Throat:      Mouth: Mucous membranes are moist.      Pharynx: No oropharyngeal exudate or posterior oropharyngeal erythema.   Cardiovascular:      Rate and Rhythm: Normal rate and regular rhythm.   Neurological:      Mental Status: She is alert.         Assessment/Plan   Problem List Items Addressed This Visit    None  Visit Diagnoses       Sore throat    -  Primary    Relevant Medications    cefdinir (Omnicef) 300 mg capsule    Other Relevant Orders    POCT Rapid Strep A manually resulted    Recurrent productive cough        Relevant Medications    codeine-guaifenesin (guaiFENesin AC)  mg/5 " mL syrup    cefdinir (Omnicef) 300 mg capsule

## 2023-04-16 DIAGNOSIS — F41.9 ANXIETY DISORDER, UNSPECIFIED: ICD-10-CM

## 2023-04-18 RX ORDER — SERTRALINE HYDROCHLORIDE 100 MG/1
TABLET, FILM COATED ORAL
Qty: 180 TABLET | Refills: 3 | Status: SHIPPED | OUTPATIENT
Start: 2023-04-18 | End: 2023-06-14 | Stop reason: ALTCHOICE

## 2023-05-08 ENCOUNTER — TELEPHONE (OUTPATIENT)
Dept: PRIMARY CARE | Facility: CLINIC | Age: 53
End: 2023-05-08
Payer: COMMERCIAL

## 2023-05-08 DIAGNOSIS — R73.9 HYPERGLYCEMIA: ICD-10-CM

## 2023-05-08 DIAGNOSIS — E78.5 DYSLIPIDEMIA: ICD-10-CM

## 2023-05-08 DIAGNOSIS — R53.83 OTHER FATIGUE: ICD-10-CM

## 2023-05-15 ENCOUNTER — LAB (OUTPATIENT)
Dept: LAB | Facility: LAB | Age: 53
End: 2023-05-15
Payer: COMMERCIAL

## 2023-05-15 DIAGNOSIS — E78.5 DYSLIPIDEMIA: ICD-10-CM

## 2023-05-15 DIAGNOSIS — R73.9 HYPERGLYCEMIA: ICD-10-CM

## 2023-05-15 DIAGNOSIS — R53.83 OTHER FATIGUE: ICD-10-CM

## 2023-05-15 LAB
ALANINE AMINOTRANSFERASE (SGPT) (U/L) IN SER/PLAS: 27 U/L (ref 7–45)
ALBUMIN (G/DL) IN SER/PLAS: 4.3 G/DL (ref 3.4–5)
ALKALINE PHOSPHATASE (U/L) IN SER/PLAS: 92 U/L (ref 33–110)
ANION GAP IN SER/PLAS: 16 MMOL/L (ref 10–20)
ASPARTATE AMINOTRANSFERASE (SGOT) (U/L) IN SER/PLAS: 19 U/L (ref 9–39)
BILIRUBIN TOTAL (MG/DL) IN SER/PLAS: 0.4 MG/DL (ref 0–1.2)
CALCIUM (MG/DL) IN SER/PLAS: 9.4 MG/DL (ref 8.6–10.3)
CARBON DIOXIDE, TOTAL (MMOL/L) IN SER/PLAS: 24 MMOL/L (ref 21–32)
CHLORIDE (MMOL/L) IN SER/PLAS: 106 MMOL/L (ref 98–107)
CHOLESTEROL (MG/DL) IN SER/PLAS: 163 MG/DL (ref 0–199)
CHOLESTEROL IN HDL (MG/DL) IN SER/PLAS: 53.8 MG/DL
CHOLESTEROL/HDL RATIO: 3
CREATININE (MG/DL) IN SER/PLAS: 1.15 MG/DL (ref 0.5–1.05)
ERYTHROCYTE DISTRIBUTION WIDTH (RATIO) BY AUTOMATED COUNT: 13.1 % (ref 11.5–14.5)
ERYTHROCYTE MEAN CORPUSCULAR HEMOGLOBIN CONCENTRATION (G/DL) BY AUTOMATED: 31.6 G/DL (ref 32–36)
ERYTHROCYTE MEAN CORPUSCULAR VOLUME (FL) BY AUTOMATED COUNT: 89 FL (ref 80–100)
ERYTHROCYTES (10*6/UL) IN BLOOD BY AUTOMATED COUNT: 4.43 X10E12/L (ref 4–5.2)
ESTIMATED AVERAGE GLUCOSE FOR HBA1C: 131 MG/DL
GFR FEMALE: 57 ML/MIN/1.73M2
GLUCOSE (MG/DL) IN SER/PLAS: 117 MG/DL (ref 74–99)
HEMATOCRIT (%) IN BLOOD BY AUTOMATED COUNT: 39.5 % (ref 36–46)
HEMOGLOBIN (G/DL) IN BLOOD: 12.5 G/DL (ref 12–16)
HEMOGLOBIN A1C/HEMOGLOBIN TOTAL IN BLOOD: 6.2 %
LDL: 78 MG/DL (ref 0–99)
LEUKOCYTES (10*3/UL) IN BLOOD BY AUTOMATED COUNT: 7.5 X10E9/L (ref 4.4–11.3)
PLATELETS (10*3/UL) IN BLOOD AUTOMATED COUNT: 296 X10E9/L (ref 150–450)
POTASSIUM (MMOL/L) IN SER/PLAS: 4.2 MMOL/L (ref 3.5–5.3)
PROTEIN TOTAL: 7 G/DL (ref 6.4–8.2)
SODIUM (MMOL/L) IN SER/PLAS: 142 MMOL/L (ref 136–145)
THYROTROPIN (MIU/L) IN SER/PLAS BY DETECTION LIMIT <= 0.05 MIU/L: 3.08 MIU/L (ref 0.44–3.98)
THYROXINE (T4) (UG/DL) IN SER/PLAS: 8.1 UG/DL (ref 4.5–11.1)
THYROXINE (T4) FREE (NG/DL) IN SER/PLAS: 0.99 NG/DL (ref 0.61–1.12)
TRIGLYCERIDE (MG/DL) IN SER/PLAS: 155 MG/DL (ref 0–149)
UREA NITROGEN (MG/DL) IN SER/PLAS: 16 MG/DL (ref 6–23)
VLDL: 31 MG/DL (ref 0–40)

## 2023-05-15 PROCEDURE — 85027 COMPLETE CBC AUTOMATED: CPT

## 2023-05-15 PROCEDURE — 36415 COLL VENOUS BLD VENIPUNCTURE: CPT

## 2023-05-15 PROCEDURE — 80061 LIPID PANEL: CPT

## 2023-05-15 PROCEDURE — 84443 ASSAY THYROID STIM HORMONE: CPT

## 2023-05-15 PROCEDURE — 83036 HEMOGLOBIN GLYCOSYLATED A1C: CPT

## 2023-05-15 PROCEDURE — 80053 COMPREHEN METABOLIC PANEL: CPT

## 2023-05-15 PROCEDURE — 84439 ASSAY OF FREE THYROXINE: CPT

## 2023-05-17 ENCOUNTER — OFFICE VISIT (OUTPATIENT)
Dept: PRIMARY CARE | Facility: CLINIC | Age: 53
End: 2023-05-17
Payer: COMMERCIAL

## 2023-05-17 VITALS
BODY MASS INDEX: 31.01 KG/M2 | WEIGHT: 175 LBS | DIASTOLIC BLOOD PRESSURE: 70 MMHG | HEART RATE: 74 BPM | OXYGEN SATURATION: 98 % | HEIGHT: 63 IN | RESPIRATION RATE: 14 BRPM | SYSTOLIC BLOOD PRESSURE: 128 MMHG

## 2023-05-17 DIAGNOSIS — R73.9 HYPERGLYCEMIA: ICD-10-CM

## 2023-05-17 DIAGNOSIS — F32.A DEPRESSIVE DISORDER: Primary | ICD-10-CM

## 2023-05-17 DIAGNOSIS — R63.5 WEIGHT GAIN: ICD-10-CM

## 2023-05-17 DIAGNOSIS — E78.5 DYSLIPIDEMIA: ICD-10-CM

## 2023-05-17 PROBLEM — G43.909 MIGRAINE: Chronic | Status: ACTIVE | Noted: 2022-08-26

## 2023-05-17 PROBLEM — M19.90 GENERALIZED ARTHRITIS: Status: ACTIVE | Noted: 2022-08-26

## 2023-05-17 PROBLEM — M79.7 FIBROMYALGIA: Chronic | Status: ACTIVE | Noted: 2022-08-26

## 2023-05-17 PROBLEM — K21.9 GERD (GASTROESOPHAGEAL REFLUX DISEASE): Chronic | Status: ACTIVE | Noted: 2022-08-26

## 2023-05-17 PROBLEM — E66.9 OBESITY (BMI 30-39.9): Status: ACTIVE | Noted: 2023-05-17

## 2023-05-17 PROCEDURE — 99214 OFFICE O/P EST MOD 30 MIN: CPT | Performed by: FAMILY MEDICINE

## 2023-05-17 RX ORDER — PHENTERMINE HYDROCHLORIDE 37.5 MG/1
37.5 CAPSULE ORAL
Qty: 30 CAPSULE | Refills: 0 | Status: SHIPPED | OUTPATIENT
Start: 2023-05-17 | End: 2023-06-14 | Stop reason: SDUPTHER

## 2023-05-17 ASSESSMENT — ENCOUNTER SYMPTOMS
CARDIOVASCULAR NEGATIVE: 1
NEUROLOGICAL NEGATIVE: 1
ALLERGIC/IMMUNOLOGIC NEGATIVE: 1
HEMATOLOGIC/LYMPHATIC NEGATIVE: 1
PSYCHIATRIC NEGATIVE: 1
GASTROINTESTINAL NEGATIVE: 1
CONSTITUTIONAL NEGATIVE: 1
MUSCULOSKELETAL NEGATIVE: 1
EYES NEGATIVE: 1
RESPIRATORY NEGATIVE: 1
ENDOCRINE NEGATIVE: 1

## 2023-05-17 NOTE — PROGRESS NOTES
"Subjective   Patient ID: Louise Sanford is a 52 y.o. female who presents for weight gain      Pt states she would like to discuss something to help her lose weight.    Pt would like to discuss something else than her Wellbutrin, she states it is making her feel emonitial.  She feels with the Wellbutrin 450 daily and the sertraline 200 nightly that she is not able to express her emotions.  We discussed this in detail today and recommending starting by cutting the sertraline from 200 nightly to 100 nightly.  We discussed that we would leave the Wellbutrin where it is for now and work on cutting it back in the future as were going to see her in 4-week intervals at this point.    Review of Systems   Constitutional: Negative.    HENT: Negative.     Eyes: Negative.    Respiratory: Negative.     Cardiovascular: Negative.    Gastrointestinal: Negative.    Endocrine: Negative.    Genitourinary: Negative.    Musculoskeletal: Negative.    Skin: Negative.    Allergic/Immunologic: Negative.    Neurological: Negative.    Hematological: Negative.    Psychiatric/Behavioral: Negative.         Objective   /70 (BP Location: Left arm, Patient Position: Sitting, BP Cuff Size: Adult)   Pulse 74   Resp 14   Ht 1.6 m (5' 3\")   Wt 79.4 kg (175 lb)   SpO2 98%   BMI 31.00 kg/m²     Physical Exam CONSTITUTIONAL- NAD, Pt is A & O x3, Vital signs reviewed per chart.  General Appearance- normal , good hygiene,talks easily  EYES-PERRLA conjunctiva and lids- normal  EARS/NOSE-TM's normal, head normocephalic and atraumatic, naso pharynx-no nasal discharge, no trismus,  oropharynx- normal without exudate  NECK- supple, FROM, without mass  thyroid- NT, normal size, no nodule noted  LYMPH- WNL  CV- RRR without murmur, gallop, or other abnormality.  PULM- CTA bilaterally  Respiratory effort- normal respiratory effort , no retractions or nasal flaring  ABDOMEN- normoactive BS's , soft , NT, no hepatosplenomegaly palpated, or masses. No " pulsatile masses noted  extremities no edema,NT  SKIN- no abnormal skin lesions on inspection or palpation  neuro- no focal deficits  PSYCH- pleasant, normal judgement and insight     Assessment/Plan   Problem List Items Addressed This Visit       Depressive disorder - Primary    Dyslipidemia    Hyperglycemia     Other Visit Diagnoses       Weight gain        Relevant Medications    phentermine 37.5 mg capsule             Scribe Attestation  By signing my name below, IBobby DO  , Scribe   attest that this documentation has been prepared under the direction and in the presence of Bobby Mcgowan DO.

## 2023-05-30 DIAGNOSIS — F41.9 ANXIETY DISORDER, UNSPECIFIED: ICD-10-CM

## 2023-05-30 RX ORDER — BUPROPION HYDROCHLORIDE 150 MG/1
TABLET ORAL
Qty: 90 TABLET | Refills: 3 | Status: SHIPPED | OUTPATIENT
Start: 2023-05-30 | End: 2023-08-11 | Stop reason: SDUPTHER

## 2023-06-14 ENCOUNTER — OFFICE VISIT (OUTPATIENT)
Dept: PRIMARY CARE | Facility: CLINIC | Age: 53
End: 2023-06-14
Payer: COMMERCIAL

## 2023-06-14 VITALS
RESPIRATION RATE: 14 BRPM | HEART RATE: 76 BPM | HEIGHT: 63 IN | DIASTOLIC BLOOD PRESSURE: 78 MMHG | OXYGEN SATURATION: 98 % | SYSTOLIC BLOOD PRESSURE: 130 MMHG | BODY MASS INDEX: 29.95 KG/M2 | WEIGHT: 169 LBS

## 2023-06-14 DIAGNOSIS — R63.5 WEIGHT GAIN: ICD-10-CM

## 2023-06-14 DIAGNOSIS — M79.89 MASS OF SOFT TISSUE OF THIGH: ICD-10-CM

## 2023-06-14 DIAGNOSIS — E78.5 DYSLIPIDEMIA: ICD-10-CM

## 2023-06-14 DIAGNOSIS — Z12.31 ENCOUNTER FOR SCREENING MAMMOGRAM FOR MALIGNANT NEOPLASM OF BREAST: ICD-10-CM

## 2023-06-14 DIAGNOSIS — F32.A DEPRESSIVE DISORDER: ICD-10-CM

## 2023-06-14 DIAGNOSIS — M25.559 HIP PAIN, UNSPECIFIED LATERALITY: ICD-10-CM

## 2023-06-14 DIAGNOSIS — R73.9 HYPERGLYCEMIA: ICD-10-CM

## 2023-06-14 DIAGNOSIS — J32.9 SINUSITIS, UNSPECIFIED CHRONICITY, UNSPECIFIED LOCATION: Primary | ICD-10-CM

## 2023-06-14 PROCEDURE — 1036F TOBACCO NON-USER: CPT | Performed by: FAMILY MEDICINE

## 2023-06-14 PROCEDURE — 99214 OFFICE O/P EST MOD 30 MIN: CPT | Performed by: FAMILY MEDICINE

## 2023-06-14 RX ORDER — PHENTERMINE HYDROCHLORIDE 37.5 MG/1
37.5 CAPSULE ORAL
Qty: 30 CAPSULE | Refills: 0 | Status: SHIPPED | OUTPATIENT
Start: 2023-06-14 | End: 2023-07-14 | Stop reason: SDUPTHER

## 2023-06-14 RX ORDER — DULOXETIN HYDROCHLORIDE 30 MG/1
30 CAPSULE, DELAYED RELEASE ORAL DAILY
Qty: 7 CAPSULE | Refills: 0 | Status: SHIPPED | OUTPATIENT
Start: 2023-06-14 | End: 2023-06-27 | Stop reason: ALTCHOICE

## 2023-06-14 RX ORDER — DULOXETIN HYDROCHLORIDE 60 MG/1
60 CAPSULE, DELAYED RELEASE ORAL DAILY
Qty: 30 CAPSULE | Refills: 5 | Status: SHIPPED | OUTPATIENT
Start: 2023-06-14 | End: 2023-07-06

## 2023-06-14 RX ORDER — CEFUROXIME AXETIL 500 MG/1
500 TABLET ORAL 2 TIMES DAILY
Qty: 20 TABLET | Refills: 0 | Status: SHIPPED | OUTPATIENT
Start: 2023-06-14 | End: 2023-06-27 | Stop reason: ALTCHOICE

## 2023-06-14 ASSESSMENT — ENCOUNTER SYMPTOMS
GASTROINTESTINAL NEGATIVE: 1
NEUROLOGICAL NEGATIVE: 1
MUSCULOSKELETAL NEGATIVE: 1
PSYCHIATRIC NEGATIVE: 1
ALLERGIC/IMMUNOLOGIC NEGATIVE: 1
LEG PAIN: 1
HEMATOLOGIC/LYMPHATIC NEGATIVE: 1
ENDOCRINE NEGATIVE: 1
CONSTITUTIONAL NEGATIVE: 1
CARDIOVASCULAR NEGATIVE: 1
RESPIRATORY NEGATIVE: 1
EYES NEGATIVE: 1

## 2023-06-14 NOTE — PROGRESS NOTES
"Subjective   Patient ID: Louise Sanford is a 52 y.o. female who presents for leg pain and weight gain.     Leg Pain   The incident occurred 6 to 12 hours ago. There was no injury mechanism. The pain is present in the right hip.     She also has extensive stress and emotional turmoil in her life right now.  She been crying daily for the past week.  She has been watching her daughters 2 children for the past 4 years every day without any compensation.  She was helping them save for a house.  She asked for some help this summer from them and the daughter completely isolated herself her  and her children from the patient.  She states this was a very design and moved to be hurtful and punitive and it has been.    Next she outlines concern about a soft tissue growth in her right upper thigh that has been present for the past several months.  She has not had any direct trauma to the area we asked about a pulled muscle and she is not sure if that may or may not have happened in the area as well.  This soft tissue mass is not painful its not warm its not tender to touch.       weight gain -pt is currently taking Adipex and states it works well. Pt would like blood work done today.    Review of Systems   Constitutional: Negative.    HENT: Negative.     Eyes: Negative.    Respiratory: Negative.     Cardiovascular: Negative.    Gastrointestinal: Negative.    Endocrine: Negative.    Genitourinary: Negative.    Musculoskeletal: Negative.    Skin: Negative.    Allergic/Immunologic: Negative.    Neurological: Negative.    Hematological: Negative.    Psychiatric/Behavioral: Negative.         Objective   /78 (BP Location: Left arm, Patient Position: Sitting, BP Cuff Size: Adult)   Pulse 76   Resp 14   Ht 1.6 m (5' 3\")   Wt 76.7 kg (169 lb)   SpO2 98%   BMI 29.94 kg/m²     Physical Exam CONSTITUTIONAL- NAD, Pt is A & O x3, Vital signs reviewed per chart.  General Appearance- normal , good hygiene,talks " easily  EYES-PERRLA conjunctiva and lids- normal  EARS/NOSE-TM's normal, head normocephalic and atraumatic, naso pharynx-no nasal discharge, no trismus,  oropharynx- normal without exudate  NECK- supple, FROM, without mass  thyroid- NT, normal size, no nodule noted  LYMPH- WNL  CV- RRR without murmur, gallop, or other abnormality.  PULM- CTA bilaterally  Respiratory effort- normal respiratory effort , no retractions or nasal flaring  ABDOMEN- normoactive BS's , soft , NT, no hepatosplenomegaly palpated, or masses. No pulsatile masses noted  extremities no edema,NT  SKIN- no abnormal skin lesions on inspection or palpation  neuro- no focal deficits  PSYCH- pleasant, normal judgement and insight   6cm soft tissue mass on right upper thigh.   Assessment/Plan   Problem List Items Addressed This Visit       Depressive disorder    Relevant Medications    DULoxetine (Cymbalta) 30 mg DR capsule    DULoxetine (Cymbalta) 60 mg DR capsule    Dyslipidemia    Relevant Orders    CBC    Comprehensive Metabolic Panel    Lipid Panel    Thyroid Stimulating Hormone    Thyroxine, Free    Thyroxine, Total    Hyperglycemia    Relevant Orders    CBC    Comprehensive Metabolic Panel    Mass of soft tissue of thigh    Relevant Orders    US biopsy thigh soft tissue    US nonvascular extremity US extremity nonvascular real time w image documentation complete    Weight gain    Relevant Medications    phentermine 37.5 mg capsule    Other Relevant Orders    Lipid Panel    Thyroid Stimulating Hormone    Thyroxine, Free    Thyroxine, Total     Other Visit Diagnoses       Sinusitis, unspecified chronicity, unspecified location    -  Primary    Relevant Medications    cefuroxime (Ceftin) 500 mg tablet    Encounter for screening mammogram for malignant neoplasm of breast        Relevant Orders    BI mammo bilateral screening tomosynthesis    Hip pain, unspecified laterality        Relevant Orders    Disability Placard             Scribe  Attestation  By signing my name below, I, Bobby Mcgowan DO  , Scribe   attest that this documentation has been prepared under the direction and in the presence of Bobby Mcgowan DO.

## 2023-06-15 PROBLEM — M79.89 MASS OF SOFT TISSUE OF THIGH: Status: ACTIVE | Noted: 2023-06-15

## 2023-06-15 PROBLEM — R63.5 WEIGHT GAIN: Status: ACTIVE | Noted: 2023-06-15

## 2023-06-20 ENCOUNTER — LAB (OUTPATIENT)
Dept: LAB | Facility: LAB | Age: 53
End: 2023-06-20
Payer: COMMERCIAL

## 2023-06-20 ENCOUNTER — TELEPHONE (OUTPATIENT)
Dept: PRIMARY CARE | Facility: CLINIC | Age: 53
End: 2023-06-20

## 2023-06-20 DIAGNOSIS — E78.5 DYSLIPIDEMIA: ICD-10-CM

## 2023-06-20 DIAGNOSIS — R73.9 HYPERGLYCEMIA: ICD-10-CM

## 2023-06-20 DIAGNOSIS — F32.A DEPRESSIVE DISORDER: ICD-10-CM

## 2023-06-20 DIAGNOSIS — R63.5 WEIGHT GAIN: ICD-10-CM

## 2023-06-20 LAB
ALANINE AMINOTRANSFERASE (SGPT) (U/L) IN SER/PLAS: 23 U/L (ref 7–45)
ALBUMIN (G/DL) IN SER/PLAS: 4.4 G/DL (ref 3.4–5)
ALKALINE PHOSPHATASE (U/L) IN SER/PLAS: 87 U/L (ref 33–110)
ANION GAP IN SER/PLAS: 14 MMOL/L (ref 10–20)
ASPARTATE AMINOTRANSFERASE (SGOT) (U/L) IN SER/PLAS: 18 U/L (ref 9–39)
BILIRUBIN TOTAL (MG/DL) IN SER/PLAS: 0.4 MG/DL (ref 0–1.2)
CALCIUM (MG/DL) IN SER/PLAS: 9.5 MG/DL (ref 8.6–10.3)
CARBON DIOXIDE, TOTAL (MMOL/L) IN SER/PLAS: 25 MMOL/L (ref 21–32)
CHLORIDE (MMOL/L) IN SER/PLAS: 104 MMOL/L (ref 98–107)
CHOLESTEROL (MG/DL) IN SER/PLAS: 148 MG/DL (ref 0–199)
CHOLESTEROL IN HDL (MG/DL) IN SER/PLAS: 46.6 MG/DL
CHOLESTEROL/HDL RATIO: 3.2
CREATININE (MG/DL) IN SER/PLAS: 1.13 MG/DL (ref 0.5–1.05)
ERYTHROCYTE DISTRIBUTION WIDTH (RATIO) BY AUTOMATED COUNT: 13.2 % (ref 11.5–14.5)
ERYTHROCYTE MEAN CORPUSCULAR HEMOGLOBIN CONCENTRATION (G/DL) BY AUTOMATED: 32.1 G/DL (ref 32–36)
ERYTHROCYTE MEAN CORPUSCULAR VOLUME (FL) BY AUTOMATED COUNT: 88 FL (ref 80–100)
ERYTHROCYTES (10*6/UL) IN BLOOD BY AUTOMATED COUNT: 4.78 X10E12/L (ref 4–5.2)
GFR FEMALE: 58 ML/MIN/1.73M2
GLUCOSE (MG/DL) IN SER/PLAS: 116 MG/DL (ref 74–99)
HEMATOCRIT (%) IN BLOOD BY AUTOMATED COUNT: 42.1 % (ref 36–46)
HEMOGLOBIN (G/DL) IN BLOOD: 13.5 G/DL (ref 12–16)
LDL: 66 MG/DL (ref 0–99)
LEUKOCYTES (10*3/UL) IN BLOOD BY AUTOMATED COUNT: 7.1 X10E9/L (ref 4.4–11.3)
PLATELETS (10*3/UL) IN BLOOD AUTOMATED COUNT: 330 X10E9/L (ref 150–450)
POTASSIUM (MMOL/L) IN SER/PLAS: 4.2 MMOL/L (ref 3.5–5.3)
PROTEIN TOTAL: 6.9 G/DL (ref 6.4–8.2)
SODIUM (MMOL/L) IN SER/PLAS: 139 MMOL/L (ref 136–145)
THYROTROPIN (MIU/L) IN SER/PLAS BY DETECTION LIMIT <= 0.05 MIU/L: 2.92 MIU/L (ref 0.44–3.98)
THYROXINE (T4) (UG/DL) IN SER/PLAS: 8.7 UG/DL (ref 4.5–11.1)
THYROXINE (T4) FREE (NG/DL) IN SER/PLAS: 0.93 NG/DL (ref 0.61–1.12)
TRIGLYCERIDE (MG/DL) IN SER/PLAS: 175 MG/DL (ref 0–149)
UREA NITROGEN (MG/DL) IN SER/PLAS: 15 MG/DL (ref 6–23)
VLDL: 35 MG/DL (ref 0–40)

## 2023-06-20 PROCEDURE — 84439 ASSAY OF FREE THYROXINE: CPT

## 2023-06-20 PROCEDURE — 85027 COMPLETE CBC AUTOMATED: CPT

## 2023-06-20 PROCEDURE — 84443 ASSAY THYROID STIM HORMONE: CPT

## 2023-06-20 PROCEDURE — 80053 COMPREHEN METABOLIC PANEL: CPT

## 2023-06-20 PROCEDURE — 80061 LIPID PANEL: CPT

## 2023-06-20 PROCEDURE — 36415 COLL VENOUS BLD VENIPUNCTURE: CPT

## 2023-06-20 NOTE — TELEPHONE ENCOUNTER
Dr Mcgowan pt    Pt phoned office and is requesting refill on    Trazodone    [facility]

## 2023-06-20 NOTE — TELEPHONE ENCOUNTER
PT CALLED IN AND STATED THE RADIOLOGIST INFORMED HER THAT SHE MAY NEED A MRI B/C THE ULTRASOUND DOES NOT  BONE OR ,MUSCLE.

## 2023-06-21 RX ORDER — TRAZODONE HYDROCHLORIDE 50 MG/1
50 TABLET ORAL NIGHTLY
Qty: 30 TABLET | Refills: 3 | Status: SHIPPED | OUTPATIENT
Start: 2023-06-21 | End: 2023-08-09

## 2023-06-27 ENCOUNTER — OFFICE VISIT (OUTPATIENT)
Dept: PRIMARY CARE | Facility: CLINIC | Age: 53
End: 2023-06-27
Payer: COMMERCIAL

## 2023-06-27 VITALS
WEIGHT: 166 LBS | RESPIRATION RATE: 14 BRPM | HEIGHT: 63 IN | HEART RATE: 90 BPM | OXYGEN SATURATION: 98 % | BODY MASS INDEX: 29.41 KG/M2 | DIASTOLIC BLOOD PRESSURE: 64 MMHG | SYSTOLIC BLOOD PRESSURE: 124 MMHG

## 2023-06-27 DIAGNOSIS — R73.9 HYPERGLYCEMIA: ICD-10-CM

## 2023-06-27 DIAGNOSIS — M79.89 MASS OF SOFT TISSUE OF THIGH: Primary | ICD-10-CM

## 2023-06-27 DIAGNOSIS — E66.9 OBESITY (BMI 30-39.9): ICD-10-CM

## 2023-06-27 PROCEDURE — 99214 OFFICE O/P EST MOD 30 MIN: CPT | Performed by: FAMILY MEDICINE

## 2023-06-27 ASSESSMENT — ENCOUNTER SYMPTOMS
CARDIOVASCULAR NEGATIVE: 1
MUSCULOSKELETAL NEGATIVE: 1
EYES NEGATIVE: 1
CONSTITUTIONAL NEGATIVE: 1
RESPIRATORY NEGATIVE: 1
GASTROINTESTINAL NEGATIVE: 1
NEUROLOGICAL NEGATIVE: 1
ENDOCRINE NEGATIVE: 1
HEMATOLOGIC/LYMPHATIC NEGATIVE: 1
ALLERGIC/IMMUNOLOGIC NEGATIVE: 1
PSYCHIATRIC NEGATIVE: 1

## 2023-06-27 NOTE — PROGRESS NOTES
"Subjective   Patient ID: Louise aSnford is a 52 y.o. female who presents for a 6cm soft tissue mass on her right upper thigh.     HPI Pt had a US done on 06/20/23 and a mammogram on 06/20/23 and would like to go over the results .  Pt had blood work done  and would like to go over the results today.    Review of Systems   Constitutional: Negative.    HENT: Negative.     Eyes: Negative.    Respiratory: Negative.     Cardiovascular: Negative.    Gastrointestinal: Negative.    Endocrine: Negative.    Genitourinary: Negative.    Musculoskeletal: Negative.    Skin: Negative.    Allergic/Immunologic: Negative.    Neurological: Negative.    Hematological: Negative.    Psychiatric/Behavioral: Negative.         Objective   /64 (BP Location: Left arm, Patient Position: Sitting, BP Cuff Size: Adult)   Pulse 90   Resp 14   Ht 1.6 m (5' 3\")   Wt 75.3 kg (166 lb)   SpO2 98%   BMI 29.41 kg/m²     Physical Exam CONSTITUTIONAL- NAD, Pt is A & O x3, Vital signs reviewed per chart.  General Appearance- normal , good hygiene,talks easily  EYES-PERRLA conjunctiva and lids- normal  EARS/NOSE-TM's normal, head normocephalic and atraumatic, naso pharynx-no nasal discharge, no trismus,  oropharynx- normal without exudate  NECK- supple, FROM, without mass  thyroid- NT, normal size, no nodule noted  LYMPH- WNL  CV- RRR without murmur, gallop, or other abnormality.  PULM- CTA bilaterally  Respiratory effort- normal respiratory effort , no retractions or nasal flaring  ABDOMEN- normoactive BS's , soft , NT, no hepatosplenomegaly palpated, or masses. No pulsatile masses noted  Extremities-show persistently palpable subcutaneous mass approximately 6 cm x 9 cm.  On palpation this is smooth soft but not mobile.  SKIN- no abnormal skin lesions on inspection or palpation  neuro- no focal deficits  PSYCH- pleasant, normal judgement and insight     Assessment/Plan   Problem List Items Addressed This Visit       Hyperglycemia    Obesity " (BMI 30-39.9)    Mass of soft tissue of thigh - Primary    Relevant Orders    CT pelvis wo IV contrast     I had an extensive text discussion with the radiologist earlier this morning to discuss the findings of his initial report.  The initial report did not show anything of significance but we asked him to give us an indication whether he feels this is the density of muscle, fat, or other structure.  He did issue an addendum report and recommended a CT of the pelvis to further define this soft tissue mass.  Based on our physical exam this may be consistent with a partially torn muscle, but she has no history of any trauma to this area.   Scribe Attestation  By signing my name below, I, Bobby Mcgowan DO  , Scribe   attest that this documentation has been prepared under the direction and in the presence of Bobby Mcgowan DO.

## 2023-07-06 DIAGNOSIS — F32.A DEPRESSIVE DISORDER: ICD-10-CM

## 2023-07-06 RX ORDER — DULOXETIN HYDROCHLORIDE 60 MG/1
60 CAPSULE, DELAYED RELEASE ORAL DAILY
Qty: 30 CAPSULE | Refills: 5 | Status: SHIPPED | OUTPATIENT
Start: 2023-07-06 | End: 2024-01-11

## 2023-07-14 ENCOUNTER — OFFICE VISIT (OUTPATIENT)
Dept: PRIMARY CARE | Facility: CLINIC | Age: 53
End: 2023-07-14
Payer: COMMERCIAL

## 2023-07-14 VITALS
WEIGHT: 166 LBS | OXYGEN SATURATION: 99 % | HEIGHT: 63 IN | SYSTOLIC BLOOD PRESSURE: 118 MMHG | RESPIRATION RATE: 16 BRPM | HEART RATE: 93 BPM | BODY MASS INDEX: 29.41 KG/M2 | DIASTOLIC BLOOD PRESSURE: 72 MMHG

## 2023-07-14 DIAGNOSIS — R63.5 WEIGHT GAIN: ICD-10-CM

## 2023-07-14 DIAGNOSIS — D17.23 LIPOMA OF RIGHT LOWER EXTREMITY: ICD-10-CM

## 2023-07-14 DIAGNOSIS — F32.A DEPRESSIVE DISORDER: ICD-10-CM

## 2023-07-14 PROCEDURE — 99214 OFFICE O/P EST MOD 30 MIN: CPT | Performed by: FAMILY MEDICINE

## 2023-07-14 RX ORDER — PHENTERMINE HYDROCHLORIDE 37.5 MG/1
37.5 CAPSULE ORAL
Qty: 30 CAPSULE | Refills: 0 | Status: SHIPPED | OUTPATIENT
Start: 2023-07-14 | End: 2023-08-11 | Stop reason: SINTOL

## 2023-07-14 RX ORDER — ALBUTEROL SULFATE 90 UG/1
2 AEROSOL, METERED RESPIRATORY (INHALATION)
COMMUNITY
End: 2024-05-01 | Stop reason: WASHOUT

## 2023-07-14 ASSESSMENT — ENCOUNTER SYMPTOMS
MUSCULOSKELETAL NEGATIVE: 1
ALLERGIC/IMMUNOLOGIC NEGATIVE: 1
CONSTITUTIONAL NEGATIVE: 1
GASTROINTESTINAL NEGATIVE: 1
CARDIOVASCULAR NEGATIVE: 1
HEMATOLOGIC/LYMPHATIC NEGATIVE: 1
RESPIRATORY NEGATIVE: 1
PSYCHIATRIC NEGATIVE: 1
ENDOCRINE NEGATIVE: 1
NEUROLOGICAL NEGATIVE: 1
EYES NEGATIVE: 1

## 2023-07-14 NOTE — PROGRESS NOTES
"Subjective   Patient ID: Louise Sanford is a 52 y.o. female who presents for a follow up on weight gain and mass of soft tissue thigh.     HPI Pt is in for a follow up On mass on her hip. Review Ct scan.     Denies any other concern.     Discuss weight loss medication. Pt is currently taking adipex that is working well for her.     Review of Systems   Constitutional: Negative.    HENT: Negative.     Eyes: Negative.    Respiratory: Negative.     Cardiovascular: Negative.    Gastrointestinal: Negative.    Endocrine: Negative.    Genitourinary: Negative.    Musculoskeletal: Negative.    Skin: Negative.    Allergic/Immunologic: Negative.    Neurological: Negative.    Hematological: Negative.    Psychiatric/Behavioral: Negative.         Objective   /72   Pulse 93   Resp 16   Ht 1.6 m (5' 3\")   Wt 75.3 kg (166 lb)   SpO2 99%   BMI 29.41 kg/m²     Physical Exam CONSTITUTIONAL- NAD, Pt is A & O x3, Vital signs reviewed per chart.  General Appearance- normal , good hygiene,talks easily  EYES-PERRLA conjunctiva and lids- normal  EARS/NOSE-TM's normal, head normocephalic and atraumatic, naso pharynx-no nasal discharge, no trismus,  oropharynx- normal without exudate  NECK- supple, FROM, without mass  thyroid- NT, normal size, no nodule noted  LYMPH- WNL  CV- RRR without murmur, gallop, or other abnormality.  PULM- CTA bilaterally  Respiratory effort- normal respiratory effort , no retractions or nasal flaring  ABDOMEN- normoactive BS's , soft , NT, no hepatosplenomegaly palpated, or masses. No pulsatile masses noted  extremities no edema,NT  SKIN- no abnormal skin lesions on inspection or palpation  neuro- no focal deficits  PSYCH- pleasant, normal judgement and insight     Assessment/Plan   Problem List Items Addressed This Visit       Depressive disorder    Relevant Medications    cariprazine (Vraylar) 1.5 mg capsule    Weight gain    Relevant Medications    phentermine 37.5 mg capsule    Lipoma of right lower " extremity        Scribe Attestation  By signing my name below, I, Bobby Mcgowan DO  , Scribe   attest that this documentation has been prepared under the direction and in the presence of Bobby Mcgowan DO.

## 2023-08-08 DIAGNOSIS — F32.A DEPRESSIVE DISORDER: ICD-10-CM

## 2023-08-09 RX ORDER — TRAZODONE HYDROCHLORIDE 50 MG/1
50 TABLET ORAL NIGHTLY
Qty: 90 TABLET | Refills: 1 | Status: SHIPPED | OUTPATIENT
Start: 2023-08-09 | End: 2023-09-08 | Stop reason: SDUPTHER

## 2023-08-11 ENCOUNTER — OFFICE VISIT (OUTPATIENT)
Dept: PRIMARY CARE | Facility: CLINIC | Age: 53
End: 2023-08-11
Payer: COMMERCIAL

## 2023-08-11 VITALS
BODY MASS INDEX: 29.59 KG/M2 | RESPIRATION RATE: 14 BRPM | HEART RATE: 74 BPM | HEIGHT: 63 IN | WEIGHT: 167 LBS | OXYGEN SATURATION: 97 % | DIASTOLIC BLOOD PRESSURE: 74 MMHG | SYSTOLIC BLOOD PRESSURE: 126 MMHG

## 2023-08-11 DIAGNOSIS — M25.561 RIGHT KNEE PAIN, UNSPECIFIED CHRONICITY: ICD-10-CM

## 2023-08-11 DIAGNOSIS — F41.9 ANXIETY DISORDER, UNSPECIFIED: ICD-10-CM

## 2023-08-11 DIAGNOSIS — H10.9 CONJUNCTIVITIS, UNSPECIFIED CONJUNCTIVITIS TYPE, UNSPECIFIED LATERALITY: ICD-10-CM

## 2023-08-11 DIAGNOSIS — R63.5 WEIGHT GAIN: ICD-10-CM

## 2023-08-11 DIAGNOSIS — H60.91 OTITIS EXTERNA OF RIGHT EAR, UNSPECIFIED CHRONICITY, UNSPECIFIED TYPE: ICD-10-CM

## 2023-08-11 PROBLEM — E66.9 OBESITY (BMI 30-39.9): Status: RESOLVED | Noted: 2023-05-17 | Resolved: 2023-08-11

## 2023-08-11 PROCEDURE — 99214 OFFICE O/P EST MOD 30 MIN: CPT | Performed by: FAMILY MEDICINE

## 2023-08-11 RX ORDER — BUPROPION HYDROCHLORIDE 300 MG/1
300 TABLET ORAL DAILY
Qty: 90 TABLET | Refills: 3 | Status: SHIPPED | OUTPATIENT
Start: 2023-08-11 | End: 2024-05-01 | Stop reason: WASHOUT

## 2023-08-11 RX ORDER — NEOMYCIN SULFATE, POLYMYXIN B SULFATE AND DEXAMETHASONE 3.5; 10000; 1 MG/ML; [USP'U]/ML; MG/ML
1 SUSPENSION/ DROPS OPHTHALMIC 4 TIMES DAILY
Qty: 1.4 ML | Refills: 0 | Status: SHIPPED | OUTPATIENT
Start: 2023-08-11 | End: 2023-08-18

## 2023-08-11 RX ORDER — SEMAGLUTIDE 0.25 MG/.5ML
0.25 INJECTION, SOLUTION SUBCUTANEOUS
Qty: 2 ML | Refills: 0 | Status: SHIPPED | OUTPATIENT
Start: 2023-08-11 | End: 2023-09-08 | Stop reason: SDUPTHER

## 2023-08-11 RX ORDER — BUPROPION HYDROCHLORIDE 150 MG/1
150 TABLET ORAL DAILY
Qty: 90 TABLET | Refills: 3 | Status: SHIPPED | OUTPATIENT
Start: 2023-08-11 | End: 2024-05-01 | Stop reason: WASHOUT

## 2023-08-11 RX ORDER — NEOMYCIN SULFATE, POLYMYXIN B SULFATE, HYDROCORTISONE 3.5; 10000; 1 MG/ML; [USP'U]/ML; MG/ML
2 SOLUTION/ DROPS AURICULAR (OTIC) 4 TIMES DAILY
Qty: 2.8 ML | Refills: 0 | Status: SHIPPED | OUTPATIENT
Start: 2023-08-11 | End: 2023-08-18

## 2023-08-11 ASSESSMENT — ENCOUNTER SYMPTOMS
ENDOCRINE NEGATIVE: 1
ALLERGIC/IMMUNOLOGIC NEGATIVE: 1
EYES NEGATIVE: 1
CARDIOVASCULAR NEGATIVE: 1
HEMATOLOGIC/LYMPHATIC NEGATIVE: 1
NEUROLOGICAL NEGATIVE: 1
RESPIRATORY NEGATIVE: 1
CONSTITUTIONAL NEGATIVE: 1
PSYCHIATRIC NEGATIVE: 1
MUSCULOSKELETAL NEGATIVE: 1
GASTROINTESTINAL NEGATIVE: 1

## 2023-08-11 NOTE — PROGRESS NOTES
"Subjective   Patient ID: Louise Sanford is a 52 y.o. female who presents for Weight Gain.    HPI Pt is currently taking Adipex and states she would like to try something different.    Review of Systems   Constitutional: Negative.    HENT: Negative.     Eyes: Negative.    Respiratory: Negative.     Cardiovascular: Negative.    Gastrointestinal: Negative.    Endocrine: Negative.    Genitourinary: Negative.    Musculoskeletal: Negative.    Skin: Negative.    Allergic/Immunologic: Negative.    Neurological: Negative.    Hematological: Negative.    Psychiatric/Behavioral: Negative.         Objective   /74 (BP Location: Left arm, Patient Position: Sitting, BP Cuff Size: Adult)   Pulse 74   Resp 14   Ht 1.6 m (5' 3\")   Wt 75.8 kg (167 lb)   SpO2 97%   BMI 29.58 kg/m²     Physical Exam CONSTITUTIONAL- NAD, Pt is A & O x3, Vital signs reviewed per chart.  General Appearance- normal , good hygiene,talks easily  EYES-PERRLA conjunctiva and lids- normal  EARS/NOSE-TM's normal, head normocephalic and atraumatic, naso pharynx-no nasal discharge, no trismus,  oropharynx- normal without exudate  NECK- supple, FROM, without mass  thyroid- NT, normal size, no nodule noted  LYMPH- WNL  CV- RRR without murmur, gallop, or other abnormality.  PULM- CTA bilaterally  Respiratory effort- normal respiratory effort , no retractions or nasal flaring  ABDOMEN- normoactive BS's , soft , NT, no hepatosplenomegaly palpated, or masses. No pulsatile masses noted  extremities no edema,NT  SKIN- no abnormal skin lesions on inspection or palpation  neuro- no focal deficits  PSYCH- pleasant, normal judgement and insight     Assessment/Plan   Problem List Items Addressed This Visit       Weight gain    Relevant Medications    semaglutide, weight loss, (Wegovy) 0.25 mg/0.5 mL pen injector     Other Visit Diagnoses       Anxiety disorder, unspecified        Relevant Medications    buPROPion XL (Wellbutrin XL) 150 mg 24 hr tablet    buPROPion " XL (Wellbutrin XL) 300 mg 24 hr tablet    Otitis externa of right ear, unspecified chronicity, unspecified type        Relevant Medications    neomycin-polymyxin-HC (Cortisporin) otic solution    Right knee pain, unspecified chronicity        Relevant Orders    Referral to Orthopaedic Surgery    Conjunctivitis, unspecified conjunctivitis type, unspecified laterality        Relevant Medications    neomycin-polymyxin-dexAMETHasone (Maxitrol) 3.5mg/mL-10,000 unit/mL-0.1 % ophthalmic suspension             Scribe Attestation  By signing my name below, I, Bobby Mcgowan DO  , Scribe   attest that this documentation has been prepared under the direction and in the presence of Bobby Mcgowan DO.

## 2023-08-14 DIAGNOSIS — F32.A DEPRESSIVE DISORDER: ICD-10-CM

## 2023-08-14 NOTE — TELEPHONE ENCOUNTER
Dr. Mcgowan Pt    Pt was in on 8/11  Needs refill for  cariprazine (Vraylar) 1.5 mg capsule     Also had question for Dr. Mcgowan on whether they were changing the dosage or frequency of medication. Please advise, thank you.        Pharmacy--[facility]/pharmacy #[id number] - [address]

## 2023-09-02 DIAGNOSIS — E78.5 HYPERLIPIDEMIA, UNSPECIFIED: ICD-10-CM

## 2023-09-05 RX ORDER — ROSUVASTATIN CALCIUM 20 MG/1
20 TABLET, COATED ORAL DAILY
Qty: 90 TABLET | Refills: 3 | Status: SHIPPED | OUTPATIENT
Start: 2023-09-05 | End: 2024-05-01 | Stop reason: SDUPTHER

## 2023-09-08 ENCOUNTER — OFFICE VISIT (OUTPATIENT)
Dept: PRIMARY CARE | Facility: CLINIC | Age: 53
End: 2023-09-08
Payer: COMMERCIAL

## 2023-09-08 VITALS
SYSTOLIC BLOOD PRESSURE: 124 MMHG | RESPIRATION RATE: 14 BRPM | HEIGHT: 63 IN | OXYGEN SATURATION: 97 % | HEART RATE: 77 BPM | DIASTOLIC BLOOD PRESSURE: 64 MMHG | BODY MASS INDEX: 29.59 KG/M2 | WEIGHT: 167 LBS

## 2023-09-08 DIAGNOSIS — R63.5 WEIGHT GAIN: ICD-10-CM

## 2023-09-08 DIAGNOSIS — F51.01 PRIMARY INSOMNIA: Primary | ICD-10-CM

## 2023-09-08 DIAGNOSIS — F32.A DEPRESSIVE DISORDER: ICD-10-CM

## 2023-09-08 DIAGNOSIS — F41.9 ANXIETY DISORDER, UNSPECIFIED TYPE: ICD-10-CM
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
## 2023-09-08 PROBLEM — R59.0 CERVICAL LYMPHADENOPATHY: Status: ACTIVE | Noted: 2023-09-08

## 2023-09-08 PROBLEM — G47.00 INSOMNIA: Status: ACTIVE | Noted: 2023-09-08

## 2023-09-08 PROCEDURE — 99214 OFFICE O/P EST MOD 30 MIN: CPT | Performed by: FAMILY MEDICINE

## 2023-09-08 RX ORDER — SEMAGLUTIDE 0.25 MG/.5ML
0.25 INJECTION, SOLUTION SUBCUTANEOUS
Qty: 2 ML | Refills: 3 | Status: SHIPPED | OUTPATIENT
Start: 2023-09-08 | End: 2024-05-01 | Stop reason: WASHOUT

## 2023-09-08 RX ORDER — TRAZODONE HYDROCHLORIDE 50 MG/1
50 TABLET ORAL NIGHTLY
Qty: 90 TABLET | Refills: 1 | Status: SHIPPED | OUTPATIENT
Start: 2023-09-08 | End: 2024-05-31 | Stop reason: WASHOUT

## 2023-09-08 NOTE — PATIENT INSTRUCTIONS
Stop wellbutrin 300 mg     Continue wellbutrin 150 for 3 days then stop.     Stop vraylar for 3 days.     Start vraylar when wellbutrin 150 is complete

## 2023-09-08 NOTE — PROGRESS NOTES
Subjective   Patient ID: Louise Sanford is a 53 y.o. female who presents for Anxiety and Depression.     Pt is here for a Vraylar 1.5 , she states it is making her legs and arms jump, she would like to discuss this today.    12 Systems have been reviewed as follows.  Constitutional: Fever, weight gain, weight loss, appetite change, night sweats, fatigue, chills.  Eyes : blurry, double vision, vision, loss, tearing, redness, pain, sensitivity to light, glaucoma.  Ears: nose, mouth, and throat: Hearing loss, ringing in the ears, ear pain, nasal congestion, nasal drainage, nosebleeds, mouth, throat, irritation tooth problem.  Cardiovascular: chest pain, pressure, heart racing, palpitations, sweating, leg swelling, high or low blood pressure  Pulmonary: Cough, yellow or green sputum, blood and sputum, shortness of breath, wheezing  Gastrointestinal: Nausea, vomiting, diarrhea, constipation, pain, blood in stool, or vomitus, heartburn, difficulty swallowing  Genitourinary: incontinence, abnormal bleeding, abnormal discharge, urinary frequency, urinary hesitancy, pain, impotence sexual problem, infection, urinary retention  Musculoskeletal: Pain, stiffness, joint, redness or warmth, arthritis, back pain, weakness, muscle wasting, sprain or fracture  Neuro: Weight weakness, dizziness, change in voice, change in taste change in vision, change in hearing, loss, or change of sensation, trouble walking, balance problems coordination problems, shaking, speech problem  Endocrine: cold or heat intolerance, blood sugar problem, weight gain or loss missed periods hot flashes, sweats, change in body hair, change in libido, increased thirst, increased urination  Heme/lymph: Swelling, bleeding, problem anemia, bruising, enlarged lymph nodes  Allergic/immunologic: H. plus nasal drip, watery itchy eyes, nasal drainage, immunosuppressed  The above were reviewed and noted negative except as noted in HPI and Problem List.      Objective  "  /64 (BP Location: Left arm, Patient Position: Sitting, BP Cuff Size: Adult)   Pulse 77   Resp 14   Ht 1.6 m (5' 3\")   Wt 75.8 kg (167 lb)   SpO2 97%   BMI 29.58 kg/m²     Physical Exam CONSTITUTIONAL- NAD, Pt is A & O x3, Vital signs reviewed per chart.  General Appearance- normal , good hygiene,talks easily  EYES-PERRLA conjunctiva and lids- normal  EARS/NOSE-TM's normal, head normocephalic and atraumatic, naso pharynx-no nasal discharge, no trismus,  oropharynx- normal without exudate  NECK- supple, FROM, without mass  thyroid- NT, normal size, no nodule noted  LYMPH- WNL  CV- RRR without murmur, gallop, or other abnormality.  PULM- CTA bilaterally  Respiratory effort- normal respiratory effort , no retractions or nasal flaring  ABDOMEN- normoactive BS's , soft , NT, no hepatosplenomegaly palpated, or masses. No pulsatile masses noted  extremities no edema,NT  SKIN- no abnormal skin lesions on inspection or palpation  neuro- no focal deficits  PSYCH- pleasant, normal judgement and insight     Assessment/Plan   Problem List Items Addressed This Visit       Anxiety disorder    Depressive disorder    Relevant Medications    traZODone (Desyrel) 50 mg tablet    Weight gain    Relevant Medications    semaglutide, weight loss, (Wegovy) 0.25 mg/0.5 mL pen injector    Insomnia - Primary        Scribe Attestation  By signing my name below, I, Bobby Mcgowan DO  , Scribe   attest that this documentation has been prepared under the direction and in the presence of Bobby Mcgowan DO.  "

## 2023-10-06 PROBLEM — R09.81 NASAL CONGESTION WITH RHINORRHEA: Status: ACTIVE | Noted: 2023-10-06

## 2023-10-06 PROBLEM — H65.90 SEROUS OTITIS MEDIA: Status: ACTIVE | Noted: 2023-10-06

## 2023-10-06 PROBLEM — L03.90 CELLULITIS: Status: ACTIVE | Noted: 2023-10-06

## 2023-10-06 PROBLEM — J34.89 SINUS DRAINAGE: Status: ACTIVE | Noted: 2023-10-06

## 2023-10-06 PROBLEM — N62 BREAST HYPERTROPHY: Status: ACTIVE | Noted: 2023-10-06

## 2023-10-06 PROBLEM — Z04.9 CONDITION NOT FOUND: Status: ACTIVE | Noted: 2023-10-06

## 2023-10-06 PROBLEM — L30.4 INTERTRIGO: Status: ACTIVE | Noted: 2023-10-06

## 2023-10-06 PROBLEM — S61.531A: Status: ACTIVE | Noted: 2023-10-06

## 2023-10-06 PROBLEM — J34.89 SINUS PRESSURE: Status: ACTIVE | Noted: 2023-10-06

## 2023-10-06 PROBLEM — M25.511 RIGHT SHOULDER PAIN: Status: ACTIVE | Noted: 2023-10-06

## 2023-10-06 PROBLEM — M54.6 BACK PAIN, THORACIC: Status: ACTIVE | Noted: 2023-10-06

## 2023-10-06 PROBLEM — J34.89 NASAL CONGESTION WITH RHINORRHEA: Status: ACTIVE | Noted: 2023-10-06

## 2023-10-06 PROBLEM — H69.90 ETD (EUSTACHIAN TUBE DYSFUNCTION): Status: ACTIVE | Noted: 2023-10-06

## 2023-10-06 PROBLEM — J34.89 SINUS PAIN: Status: ACTIVE | Noted: 2023-10-06

## 2023-10-06 PROBLEM — H66.91 OTITIS MEDIA OF RIGHT EAR: Status: ACTIVE | Noted: 2023-10-06

## 2023-10-06 PROBLEM — M77.01 MEDIAL EPICONDYLITIS, RIGHT ELBOW: Status: ACTIVE | Noted: 2023-10-06

## 2023-10-06 PROBLEM — R07.89 ATYPICAL CHEST PAIN: Status: ACTIVE | Noted: 2023-10-06

## 2023-10-06 PROBLEM — L08.9 SKIN INFECTION: Status: ACTIVE | Noted: 2023-10-06

## 2023-10-06 PROBLEM — H10.9 CONJUNCTIVITIS: Status: ACTIVE | Noted: 2023-10-06

## 2023-10-06 PROBLEM — G56.21 NEURITIS OF RIGHT ULNAR NERVE: Status: ACTIVE | Noted: 2023-10-06

## 2023-10-06 PROBLEM — S83.289A LATERAL MENISCAL TEAR: Status: ACTIVE | Noted: 2023-10-06

## 2023-10-06 PROBLEM — M25.521 RIGHT ELBOW PAIN: Status: ACTIVE | Noted: 2023-10-06

## 2023-10-06 PROBLEM — S41.111A LACERATION OF RIGHT UPPER EXTREMITY: Status: ACTIVE | Noted: 2023-10-06

## 2023-10-06 PROBLEM — M54.2 CERVICALGIA: Status: ACTIVE | Noted: 2023-10-06

## 2023-10-06 PROBLEM — M75.01 ADHESIVE CAPSULITIS OF RIGHT SHOULDER: Status: ACTIVE | Noted: 2023-10-06

## 2023-10-06 PROBLEM — R06.2 WHEEZING: Status: ACTIVE | Noted: 2023-10-06

## 2023-10-06 PROBLEM — J21.9 BRONCHIOLITIS: Status: ACTIVE | Noted: 2023-10-06

## 2023-10-06 PROBLEM — J00 NASOPHARYNGITIS: Status: ACTIVE | Noted: 2023-10-06

## 2023-10-06 PROBLEM — T81.40XA POSTOPERATIVE INFECTION: Status: ACTIVE | Noted: 2023-10-06

## 2023-10-06 PROBLEM — M75.81 TENDINITIS OF RIGHT ROTATOR CUFF: Status: ACTIVE | Noted: 2023-10-06

## 2023-10-06 PROBLEM — R06.02 SHORTNESS OF BREATH: Status: ACTIVE | Noted: 2023-10-06

## 2023-10-06 PROBLEM — H92.01 RIGHT EAR PAIN: Status: ACTIVE | Noted: 2023-10-06

## 2023-10-06 RX ORDER — MUPIROCIN 20 MG/G
OINTMENT TOPICAL 3 TIMES DAILY
COMMUNITY
End: 2024-05-01 | Stop reason: WASHOUT

## 2023-10-06 RX ORDER — NEOMYCIN SULFATE, POLYMYXIN B SULFATE AND DEXAMETHASONE 3.5; 10000; 1 MG/ML; [USP'U]/ML; MG/ML
2 SUSPENSION/ DROPS OPHTHALMIC 4 TIMES DAILY
COMMUNITY
End: 2024-05-01 | Stop reason: WASHOUT

## 2023-10-06 RX ORDER — SERTRALINE HYDROCHLORIDE 100 MG/1
200 TABLET, FILM COATED ORAL DAILY
COMMUNITY
End: 2024-05-01 | Stop reason: WASHOUT

## 2023-10-06 RX ORDER — AZITHROMYCIN 250 MG/1
TABLET, FILM COATED ORAL
COMMUNITY
Start: 2023-09-19 | End: 2024-05-01 | Stop reason: WASHOUT

## 2023-10-06 RX ORDER — AMOXICILLIN AND CLAVULANATE POTASSIUM 875; 125 MG/1; MG/1
875 TABLET, FILM COATED ORAL EVERY 12 HOURS
COMMUNITY
End: 2024-05-01 | Stop reason: WASHOUT

## 2023-10-09 ENCOUNTER — APPOINTMENT (OUTPATIENT)
Dept: ORTHOPEDIC SURGERY | Facility: CLINIC | Age: 53
End: 2023-10-09
Payer: COMMERCIAL

## 2023-10-12 ENCOUNTER — APPOINTMENT (OUTPATIENT)
Dept: RADIOLOGY | Facility: HOSPITAL | Age: 53
End: 2023-10-12
Payer: COMMERCIAL

## 2023-10-12 ENCOUNTER — HOSPITAL ENCOUNTER (EMERGENCY)
Facility: HOSPITAL | Age: 53
Discharge: HOME | End: 2023-10-13
Attending: STUDENT IN AN ORGANIZED HEALTH CARE EDUCATION/TRAINING PROGRAM
Payer: COMMERCIAL

## 2023-10-12 DIAGNOSIS — R10.10 PAIN OF UPPER ABDOMEN: Primary | ICD-10-CM

## 2023-10-12 LAB
ALBUMIN SERPL BCP-MCNC: 4.1 G/DL (ref 3.4–5)
ALP SERPL-CCNC: 95 U/L (ref 33–110)
ALT SERPL W P-5'-P-CCNC: 25 U/L (ref 7–45)
ANION GAP SERPL CALC-SCNC: 12 MMOL/L (ref 10–20)
AST SERPL W P-5'-P-CCNC: 17 U/L (ref 9–39)
BASOPHILS # BLD AUTO: 0.05 X10*3/UL (ref 0–0.1)
BASOPHILS NFR BLD AUTO: 0.5 %
BILIRUB SERPL-MCNC: 0.4 MG/DL (ref 0–1.2)
BUN SERPL-MCNC: 11 MG/DL (ref 6–23)
CALCIUM SERPL-MCNC: 9.2 MG/DL (ref 8.6–10.3)
CARDIAC TROPONIN I PNL SERPL HS: 3 NG/L (ref 0–13)
CHLORIDE SERPL-SCNC: 102 MMOL/L (ref 98–107)
CO2 SERPL-SCNC: 28 MMOL/L (ref 21–32)
CREAT SERPL-MCNC: 0.92 MG/DL (ref 0.5–1.05)
EOSINOPHIL # BLD AUTO: 0.15 X10*3/UL (ref 0–0.7)
EOSINOPHIL NFR BLD AUTO: 1.6 %
ERYTHROCYTE [DISTWIDTH] IN BLOOD BY AUTOMATED COUNT: 13 % (ref 11.5–14.5)
GFR SERPL CREATININE-BSD FRML MDRD: 74 ML/MIN/1.73M*2
GLUCOSE SERPL-MCNC: 96 MG/DL (ref 74–99)
HCT VFR BLD AUTO: 43.9 % (ref 36–46)
HGB BLD-MCNC: 14.1 G/DL (ref 12–16)
IMM GRANULOCYTES # BLD AUTO: 0.03 X10*3/UL (ref 0–0.7)
IMM GRANULOCYTES NFR BLD AUTO: 0.3 % (ref 0–0.9)
LIPASE SERPL-CCNC: 35 U/L (ref 9–82)
LYMPHOCYTES # BLD AUTO: 2.58 X10*3/UL (ref 1.2–4.8)
LYMPHOCYTES NFR BLD AUTO: 27.1 %
MCH RBC QN AUTO: 28.4 PG (ref 26–34)
MCHC RBC AUTO-ENTMCNC: 32.1 G/DL (ref 32–36)
MCV RBC AUTO: 89 FL (ref 80–100)
MONOCYTES # BLD AUTO: 0.93 X10*3/UL (ref 0.1–1)
MONOCYTES NFR BLD AUTO: 9.8 %
NEUTROPHILS # BLD AUTO: 5.77 X10*3/UL (ref 1.2–7.7)
NEUTROPHILS NFR BLD AUTO: 60.7 %
NRBC BLD-RTO: 0 /100 WBCS (ref 0–0)
PLATELET # BLD AUTO: 347 X10*3/UL (ref 150–450)
PMV BLD AUTO: 10.9 FL (ref 7.5–11.5)
POTASSIUM SERPL-SCNC: 3.2 MMOL/L (ref 3.5–5.3)
PROT SERPL-MCNC: 7.3 G/DL (ref 6.4–8.2)
RBC # BLD AUTO: 4.96 X10*6/UL (ref 4–5.2)
SODIUM SERPL-SCNC: 139 MMOL/L (ref 136–145)
WBC # BLD AUTO: 9.5 X10*3/UL (ref 4.4–11.3)

## 2023-10-12 PROCEDURE — 83690 ASSAY OF LIPASE: CPT | Performed by: EMERGENCY MEDICINE

## 2023-10-12 PROCEDURE — 80053 COMPREHEN METABOLIC PANEL: CPT | Performed by: STUDENT IN AN ORGANIZED HEALTH CARE EDUCATION/TRAINING PROGRAM

## 2023-10-12 PROCEDURE — 83690 ASSAY OF LIPASE: CPT | Performed by: STUDENT IN AN ORGANIZED HEALTH CARE EDUCATION/TRAINING PROGRAM

## 2023-10-12 PROCEDURE — 74177 CT ABD & PELVIS W/CONTRAST: CPT

## 2023-10-12 PROCEDURE — 2550000001 HC RX 255 CONTRASTS: Performed by: STUDENT IN AN ORGANIZED HEALTH CARE EDUCATION/TRAINING PROGRAM

## 2023-10-12 PROCEDURE — 36415 COLL VENOUS BLD VENIPUNCTURE: CPT | Performed by: EMERGENCY MEDICINE

## 2023-10-12 PROCEDURE — 80053 COMPREHEN METABOLIC PANEL: CPT | Performed by: EMERGENCY MEDICINE

## 2023-10-12 PROCEDURE — 96361 HYDRATE IV INFUSION ADD-ON: CPT | Mod: XU | Performed by: STUDENT IN AN ORGANIZED HEALTH CARE EDUCATION/TRAINING PROGRAM

## 2023-10-12 PROCEDURE — 96365 THER/PROPH/DIAG IV INF INIT: CPT | Mod: XU | Performed by: STUDENT IN AN ORGANIZED HEALTH CARE EDUCATION/TRAINING PROGRAM

## 2023-10-12 PROCEDURE — 85025 COMPLETE CBC W/AUTO DIFF WBC: CPT | Performed by: EMERGENCY MEDICINE

## 2023-10-12 PROCEDURE — 2580000001 HC RX 258 IV SOLUTIONS

## 2023-10-12 PROCEDURE — 74177 CT ABD & PELVIS W/CONTRAST: CPT | Mod: FOREIGN READ | Performed by: RADIOLOGY

## 2023-10-12 PROCEDURE — 85025 COMPLETE CBC W/AUTO DIFF WBC: CPT | Performed by: STUDENT IN AN ORGANIZED HEALTH CARE EDUCATION/TRAINING PROGRAM

## 2023-10-12 PROCEDURE — 99285 EMERGENCY DEPT VISIT HI MDM: CPT | Mod: 25 | Performed by: STUDENT IN AN ORGANIZED HEALTH CARE EDUCATION/TRAINING PROGRAM

## 2023-10-12 PROCEDURE — 2500000004 HC RX 250 GENERAL PHARMACY W/ HCPCS (ALT 636 FOR OP/ED)

## 2023-10-12 PROCEDURE — 93010 ELECTROCARDIOGRAM REPORT: CPT | Performed by: STUDENT IN AN ORGANIZED HEALTH CARE EDUCATION/TRAINING PROGRAM

## 2023-10-12 PROCEDURE — 84484 ASSAY OF TROPONIN QUANT: CPT | Performed by: STUDENT IN AN ORGANIZED HEALTH CARE EDUCATION/TRAINING PROGRAM

## 2023-10-12 PROCEDURE — 99285 EMERGENCY DEPT VISIT HI MDM: CPT | Performed by: STUDENT IN AN ORGANIZED HEALTH CARE EDUCATION/TRAINING PROGRAM

## 2023-10-12 RX ORDER — POTASSIUM CHLORIDE 14.9 MG/ML
20 INJECTION INTRAVENOUS
Status: DISCONTINUED | OUTPATIENT
Start: 2023-10-12 | End: 2023-10-13 | Stop reason: HOSPADM

## 2023-10-12 RX ADMIN — POTASSIUM CHLORIDE 20 MEQ: 14.9 INJECTION, SOLUTION INTRAVENOUS at 23:50

## 2023-10-12 RX ADMIN — SODIUM CHLORIDE, POTASSIUM CHLORIDE, SODIUM LACTATE AND CALCIUM CHLORIDE 500 ML: 600; 310; 30; 20 INJECTION, SOLUTION INTRAVENOUS at 23:51

## 2023-10-12 RX ADMIN — IOHEXOL 75 ML: 350 INJECTION, SOLUTION INTRAVENOUS at 23:50

## 2023-10-12 ASSESSMENT — PAIN DESCRIPTION - DESCRIPTORS
DESCRIPTORS: CRAMPING;ACHING
DESCRIPTORS: ACHING;CRAMPING;RADIATING

## 2023-10-12 ASSESSMENT — PAIN DESCRIPTION - PAIN TYPE: TYPE: ACUTE PAIN

## 2023-10-12 ASSESSMENT — PAIN SCALES - GENERAL: PAINLEVEL_OUTOF10: 8

## 2023-10-12 ASSESSMENT — PAIN - FUNCTIONAL ASSESSMENT: PAIN_FUNCTIONAL_ASSESSMENT: 0-10

## 2023-10-12 ASSESSMENT — COLUMBIA-SUICIDE SEVERITY RATING SCALE - C-SSRS
2. HAVE YOU ACTUALLY HAD ANY THOUGHTS OF KILLING YOURSELF?: NO
1. IN THE PAST MONTH, HAVE YOU WISHED YOU WERE DEAD OR WISHED YOU COULD GO TO SLEEP AND NOT WAKE UP?: NO
6. HAVE YOU EVER DONE ANYTHING, STARTED TO DO ANYTHING, OR PREPARED TO DO ANYTHING TO END YOUR LIFE?: NO

## 2023-10-12 ASSESSMENT — PAIN DESCRIPTION - ONSET: ONSET: ONGOING

## 2023-10-12 ASSESSMENT — PAIN DESCRIPTION - ORIENTATION: ORIENTATION: UPPER

## 2023-10-12 ASSESSMENT — PAIN DESCRIPTION - LOCATION: LOCATION: ABDOMEN

## 2023-10-12 ASSESSMENT — PAIN DESCRIPTION - FREQUENCY: FREQUENCY: CONSTANT/CONTINUOUS

## 2023-10-13 ENCOUNTER — HOSPITAL ENCOUNTER (OUTPATIENT)
Dept: CARDIOLOGY | Facility: HOSPITAL | Age: 53
Discharge: HOME | End: 2023-10-13
Payer: COMMERCIAL

## 2023-10-13 VITALS
BODY MASS INDEX: 30.12 KG/M2 | HEART RATE: 89 BPM | DIASTOLIC BLOOD PRESSURE: 76 MMHG | HEIGHT: 63 IN | TEMPERATURE: 96.8 F | OXYGEN SATURATION: 99 % | SYSTOLIC BLOOD PRESSURE: 139 MMHG | WEIGHT: 170 LBS | RESPIRATION RATE: 20 BRPM

## 2023-10-13 LAB
APPEARANCE UR: ABNORMAL
ATRIAL RATE: 81 BPM
BILIRUB UR STRIP.AUTO-MCNC: NEGATIVE MG/DL
COLOR UR: YELLOW
GLUCOSE UR STRIP.AUTO-MCNC: NEGATIVE MG/DL
KETONES UR STRIP.AUTO-MCNC: NEGATIVE MG/DL
LEUKOCYTE ESTERASE UR QL STRIP.AUTO: NEGATIVE
NITRITE UR QL STRIP.AUTO: NEGATIVE
P AXIS: 31 DEGREES
P OFFSET: 211 MS
P ONSET: 164 MS
PH UR STRIP.AUTO: 5 [PH]
PR INTERVAL: 112 MS
PROT UR STRIP.AUTO-MCNC: NEGATIVE MG/DL
Q ONSET: 220 MS
QRS COUNT: 13 BEATS
QRS DURATION: 80 MS
QT INTERVAL: 398 MS
QTC CALCULATION(BAZETT): 462 MS
QTC FREDERICIA: 439 MS
R AXIS: 25 DEGREES
RBC # UR STRIP.AUTO: NEGATIVE /UL
SP GR UR STRIP.AUTO: 1.03
T AXIS: 64 DEGREES
T OFFSET: 419 MS
UROBILINOGEN UR STRIP.AUTO-MCNC: <2 MG/DL
VENTRICULAR RATE: 81 BPM

## 2023-10-13 PROCEDURE — 81003 URINALYSIS AUTO W/O SCOPE: CPT | Performed by: STUDENT IN AN ORGANIZED HEALTH CARE EDUCATION/TRAINING PROGRAM

## 2023-10-13 PROCEDURE — 93005 ELECTROCARDIOGRAM TRACING: CPT

## 2023-10-13 PROCEDURE — 2500000004 HC RX 250 GENERAL PHARMACY W/ HCPCS (ALT 636 FOR OP/ED): Performed by: STUDENT IN AN ORGANIZED HEALTH CARE EDUCATION/TRAINING PROGRAM

## 2023-10-13 RX ORDER — POTASSIUM CHLORIDE 20 MEQ/1
20 TABLET, EXTENDED RELEASE ORAL ONCE
Status: COMPLETED | OUTPATIENT
Start: 2023-10-13 | End: 2023-10-13

## 2023-10-13 RX ADMIN — POTASSIUM CHLORIDE 20 MEQ: 1500 TABLET, EXTENDED RELEASE ORAL at 01:10

## 2023-10-13 ASSESSMENT — LIFESTYLE VARIABLES
EVER HAD A DRINK FIRST THING IN THE MORNING TO STEADY YOUR NERVES TO GET RID OF A HANGOVER: NO
HAVE YOU EVER FELT YOU SHOULD CUT DOWN ON YOUR DRINKING: NO
EVER FELT BAD OR GUILTY ABOUT YOUR DRINKING: NO
REASON UNABLE TO ASSESS: NO
HAVE PEOPLE ANNOYED YOU BY CRITICIZING YOUR DRINKING: NO

## 2023-10-13 NOTE — ED PROVIDER NOTES
HPI   Chief Complaint   Patient presents with    Abdominal Pain     Upper radiating to lower back       53-year-old female with a history of HLD, depression, and insomnia presenting to [facility] ED with complaint of upper abdominal pain.  She reports she has had upper abdominal pain that has been worsening over the past 3 days with associated nausea, lightheadedness, and mild diaphoresis.  Reports that initially she had chest pains prior with her symptoms in which she thought this had been related to anxiety however the upper abdominal pain has persisted and is a currently an 8/10.  States she has tried using Zantac yesterday without relief.  The pain is worse with eating.  Denies coughing, fevers, vomiting, urinary symptoms, or diarrhea.      History provided by:  Patient and spouse                      Tony Coma Scale Score: 15                  Patient History   Past Medical History:   Diagnosis Date    Encounter for screening for malignant neoplasm of colon 09/11/2019    Screening for colon cancer    Personal history of other diseases of the respiratory system 01/03/2014    History of acute bronchitis    Personal history of other diseases of the respiratory system 01/17/2020    History of sore throat    Personal history of other diseases of the respiratory system 01/17/2020    History of acute pharyngitis    Personal history of other drug therapy 11/11/2019    History of influenza vaccination    Personal history of other specified conditions 01/03/2014    History of vomiting     Past Surgical History:   Procedure Laterality Date    OTHER SURGICAL HISTORY  08/30/2022    Breast reduction     Family History   Problem Relation Name Age of Onset    No Known Problems Mother       Social History     Tobacco Use    Smoking status: Former     Types: Cigarettes    Smokeless tobacco: Never   Vaping Use    Vaping Use: Never used   Substance Use Topics    Alcohol use: Never    Drug use: Never       Physical Exam   ED Triage  Vitals [10/12/23 2158]   Temp Heart Rate Resp BP   36 °C (96.8 °F) 93 18 137/83      SpO2 Temp Source Heart Rate Source Patient Position   100 % Tympanic Monitor Sitting      BP Location FiO2 (%)     Right arm --       Physical Exam  Vitals and nursing note reviewed.   Constitutional:       General: She is awake. She is not in acute distress.     Appearance: Normal appearance. She is well-developed.   HENT:      Head: Normocephalic and atraumatic.      Right Ear: External ear normal.      Left Ear: External ear normal.      Nose: Nose normal. No congestion or rhinorrhea.      Mouth/Throat:      Mouth: Mucous membranes are moist.      Pharynx: Oropharynx is clear. No posterior oropharyngeal erythema.   Eyes:      General: No scleral icterus.        Right eye: No discharge.         Left eye: No discharge.      Extraocular Movements: Extraocular movements intact.      Conjunctiva/sclera: Conjunctivae normal.   Cardiovascular:      Rate and Rhythm: Normal rate and regular rhythm.      Pulses: Normal pulses.      Heart sounds: Normal heart sounds. No murmur heard.     No friction rub.   Pulmonary:      Effort: Pulmonary effort is normal. No respiratory distress.      Breath sounds: Normal breath sounds. No stridor. No wheezing or rales.   Abdominal:      General: There is no distension.      Palpations: Abdomen is soft.      Tenderness: There is abdominal tenderness in the epigastric area. There is no right CVA tenderness, left CVA tenderness, guarding or rebound.   Musculoskeletal:         General: No swelling or tenderness.      Cervical back: Normal range of motion and neck supple.      Right lower leg: No edema.      Left lower leg: No edema.   Skin:     General: Skin is warm and dry.      Capillary Refill: Capillary refill takes less than 2 seconds.      Coloration: Skin is not jaundiced or pale.      Findings: No lesion or rash.   Neurological:      General: No focal deficit present.      Mental Status: She is  alert and oriented to person, place, and time. Mental status is at baseline.      Cranial Nerves: No cranial nerve deficit.      Sensory: No sensory deficit.      Motor: No weakness.   Psychiatric:         Mood and Affect: Mood normal.         Behavior: Behavior normal. Behavior is cooperative.         Thought Content: Thought content normal.         Judgment: Judgment normal.         ED Course & MDM   ED Course as of 10/13/23 0145   Thu Oct 12, 2023   2247 POTASSIUM(!): 3.2  Potassium replaced [ES]   Fri Oct 13, 2023   0057 CT abdomen pelvis w IV contrast  No definite acute intra-abdominal pathology identified.  Moderate to large sliding-type hiatal hernia. [ES]   0144 EKG interpretation-independently interpreted by attending physician  2209 hrs.: Normal sinus rhythm with ventricular rate of 81 bpm.  QTc 462.  QRS 80.  No acute injury pattern seen. [AI]      ED Course User Index  [AI] Ayde Hollingsworth DO  [ES] Devonte Guzman MD         Diagnoses as of 10/13/23 0145   Pain of upper abdomen       Medical Decision Making  53-year-old female with a history mentioned above presenting to the ED with a complaint of upper abdominal pain.    Patient is afebrile, hemodynamically stable on room air, and in no acute distress.  Physical exam findings mentioned above.  Patient reported that she had chest pain earlier in the week that went away on its own.  CBC, CMP, EKG, troponins, lipase, and CT abdomen and pelvis IV contrast ordered.      CT abdomen pelvis reveals a large sliding hiatal hernia. Blood work revealed no leukocytosis.  Potassium was 3.2 and was replaced.    Reassessment:  Updated patient on results and findings.  Given hiatal hernia as well as earlier abdominal discomfort, patient was given GI and general surgery to follow-up with.  She feels comfortable with plan.    Disposition: Discussed with patient who agreed with discharge.  She will follow-up with the provided gastroenterologist, surgeon, and her PCP in 1-2  days.  Given strict return precautions.        Procedure  Procedures     Devonte Guzman MD  Resident  10/13/23 1138    The patient was seen by the resident/fellow.  I have personally performed a substantive portion of the encounter.  I have seen and examined the patient; agree with the workup, evaluation, MDM, management and diagnosis.  The care plan has been discussed with the resident; I have reviewed the resident’s note and agree with the documented findings.           Ayde Hollingsworth,   10/15/23 0541

## 2023-10-13 NOTE — ED NOTES
Pt presents to the ED with abdominal pain that started about 3 days ago. Pt states pain is stabbing and throbbing. Pt does not feel pain when sitting but standing is unbearable. Pt has a couple bites of food and feels full.     Humera Gordon RN  10/12/23 1001

## 2023-10-13 NOTE — DISCHARGE INSTRUCTIONS
Please follow up with your primary care physician within 1-2 days.  Please follow-up with a general surgeon for your hiatal hernia.  You also been given a GI doctor given your abdominal pain.  If you have worsening pain, difficulty breathing, or other concerning symptoms, please seek immediate medical attention and come back into the ER.    If you have a return or worsening of symptoms, please seek immediate medical attention.

## 2023-10-16 ENCOUNTER — APPOINTMENT (OUTPATIENT)
Dept: ORTHOPEDIC SURGERY | Facility: CLINIC | Age: 53
End: 2023-10-16
Payer: COMMERCIAL

## 2023-10-27 ENCOUNTER — ANCILLARY PROCEDURE (OUTPATIENT)
Dept: RADIOLOGY | Facility: CLINIC | Age: 53
End: 2023-10-27
Payer: COMMERCIAL

## 2023-10-27 ENCOUNTER — OFFICE VISIT (OUTPATIENT)
Dept: ORTHOPEDIC SURGERY | Facility: CLINIC | Age: 53
End: 2023-10-27
Payer: COMMERCIAL

## 2023-10-27 VITALS — WEIGHT: 170 LBS | BODY MASS INDEX: 30.12 KG/M2 | HEIGHT: 63 IN

## 2023-10-27 DIAGNOSIS — M70.61 TROCHANTERIC BURSITIS OF BOTH HIPS: Primary | ICD-10-CM

## 2023-10-27 DIAGNOSIS — M70.62 TROCHANTERIC BURSITIS OF BOTH HIPS: Primary | ICD-10-CM

## 2023-10-27 DIAGNOSIS — M25.562 ACUTE PAIN OF LEFT KNEE: ICD-10-CM

## 2023-10-27 PROCEDURE — 20610 DRAIN/INJ JOINT/BURSA W/O US: CPT | Mod: 50 | Performed by: STUDENT IN AN ORGANIZED HEALTH CARE EDUCATION/TRAINING PROGRAM

## 2023-10-27 PROCEDURE — 2500000005 HC RX 250 GENERAL PHARMACY W/O HCPCS: Performed by: STUDENT IN AN ORGANIZED HEALTH CARE EDUCATION/TRAINING PROGRAM

## 2023-10-27 PROCEDURE — 2500000004 HC RX 250 GENERAL PHARMACY W/ HCPCS (ALT 636 FOR OP/ED): Performed by: STUDENT IN AN ORGANIZED HEALTH CARE EDUCATION/TRAINING PROGRAM

## 2023-10-27 PROCEDURE — 73564 X-RAY EXAM KNEE 4 OR MORE: CPT | Mod: LT

## 2023-10-27 PROCEDURE — 99214 OFFICE O/P EST MOD 30 MIN: CPT | Performed by: STUDENT IN AN ORGANIZED HEALTH CARE EDUCATION/TRAINING PROGRAM

## 2023-10-27 PROCEDURE — 1036F TOBACCO NON-USER: CPT | Performed by: STUDENT IN AN ORGANIZED HEALTH CARE EDUCATION/TRAINING PROGRAM

## 2023-10-27 PROCEDURE — 99214 OFFICE O/P EST MOD 30 MIN: CPT | Mod: 25 | Performed by: STUDENT IN AN ORGANIZED HEALTH CARE EDUCATION/TRAINING PROGRAM

## 2023-10-27 PROCEDURE — 73564 X-RAY EXAM KNEE 4 OR MORE: CPT | Mod: LEFT SIDE | Performed by: STUDENT IN AN ORGANIZED HEALTH CARE EDUCATION/TRAINING PROGRAM

## 2023-10-27 RX ORDER — TRIAMCINOLONE ACETONIDE 40 MG/ML
40 INJECTION, SUSPENSION INTRA-ARTICULAR; INTRAMUSCULAR
Status: COMPLETED | OUTPATIENT
Start: 2023-10-27 | End: 2023-10-27

## 2023-10-27 RX ORDER — LIDOCAINE HYDROCHLORIDE 10 MG/ML
4 INJECTION INFILTRATION; PERINEURAL
Status: COMPLETED | OUTPATIENT
Start: 2023-10-27 | End: 2023-10-27

## 2023-10-27 RX ADMIN — LIDOCAINE HYDROCHLORIDE 4 ML: 10 INJECTION, SOLUTION INFILTRATION; PERINEURAL at 11:34

## 2023-10-27 RX ADMIN — TRIAMCINOLONE ACETONIDE 40 MG: 40 INJECTION, SUSPENSION INTRA-ARTICULAR; INTRAMUSCULAR at 11:34

## 2023-10-27 ASSESSMENT — PAIN - FUNCTIONAL ASSESSMENT: PAIN_FUNCTIONAL_ASSESSMENT: 0-10

## 2023-10-27 ASSESSMENT — PAIN SCALES - GENERAL: PAINLEVEL_OUTOF10: 7

## 2023-10-27 ASSESSMENT — PATIENT HEALTH QUESTIONNAIRE - PHQ9
2. FEELING DOWN, DEPRESSED OR HOPELESS: NOT AT ALL
1. LITTLE INTEREST OR PLEASURE IN DOING THINGS: NOT AT ALL
SUM OF ALL RESPONSES TO PHQ9 QUESTIONS 1 AND 2: 0

## 2023-10-27 NOTE — PROGRESS NOTES
Chief Complaint   Patient presents with    Right Knee - Follow-up     1. Right knee possible meniscus tear   S/P- Cortisone inj 8/25/23    Left Knee - Follow-up, Pain     2. New complaints of left knee pain   X-Rays today        HPI  Patient presents today for follow up of her knees today with new complaints of bilateral hip pain. She states that the injection was helpful for 2 months and has recently started to wear off.  She notes that recently her left knee started to bother her.  The pain is similar to the pain she feels on the right side.  She describes it as diffuse constant pain that is worse with stairs and long periods of walking.   The hip pain has been a consistent issue for her for many years.  Recently, it has started to affect more of her daily activities.  She notes that it is primarily laterally based and does not radiate into the back or buttocks region.  She denies any radicular symptoms today.  Has  received x-rays of the hips before but denies any current or past treatments for this.      Physical exam  General: Alert and oriented to place, person, and time.  No acute distress and breathing comfortably; pleasant and cooperative with the examination.  Extremity:  Left Knee:  Skin healthy and intact  No gross swelling or ecchymosis  No varus malalignment  No valgus malalignment   No effusion  ROM: 0 to 120 degrees   Crepitus with range of motion   No pain with internal rotation of the hip  Diffuse tenderness to palpation   No laxity to valgus stress  No laxity to varus stress  Negative Lachman´s test  Negative anterior drawer test  Negative posterior drawer test  Negative Cristela´s test  Neurovascular exam normal distally    Bilateral Hip:  Normal gait  Negative Trendelenburg sign  Skin healthy and intact  No tenderness to palpation over lumbar spine  Diffuse tenderness over greater trochanters bilaterally  Full forward flexion  Symmetric motion, no loss of internal rotation   No weakness with  resisted hip flexion, abduction or adduction  Negative flexion/adduction/internal rotation  Negative flexion/abduction/external rotation test  Negative straight leg raise  Neurovascular exam normal distally      Diagnostics:  Multiple views left knee show mild osteoarthritis with joint with space narrowing. No signs of acute osseous abnormalities.            Procedures  L Inj/Asp: bilateral greater trochanteric bursa on 10/27/2023 11:34 AM  Indications: pain  Details: 22 G needle, lateral approach  Medications (Right): 40 mg triamcinolone acetonide 40 mg/mL; 4 mL lidocaine 10 mg/mL (1 %)  Medications (Left): 40 mg triamcinolone acetonide 40 mg/mL; 4 mL lidocaine 10 mg/mL (1 %)  Consent was given by the patient. Immediately prior to procedure a time out was called to verify the correct patient, procedure, equipment, support staff and site/side marked as required. Patient was prepped and draped in the usual sterile fashion.          Assessment:  1. Chronic pain of left knee, Mild Osteoarthritis of the left knee vs. Internal derangement.  - XR knee left 4+ views  2. Bilateral Trochanteric Bursitis        Treatment plan:  The history and clinical exam are consistent with intraarticular pathology and therefore MRI is medically indicated to evaluate the soft tissues of the joint. Follow up in one week or after completion of the MRI to advance management accordingly  The patient understands and agrees with the plan.  Patient also opted for bilateral cortisone injections into the trochanteric bursa to help with pain and inflammation.   She tolerated the injections well and will ice for the next few days.  We will see her back following MRI results   All of the patient's questions were answered.    In a face to face encounter, I evaluated the patient and performed a physical examination, discussed pertinent diagnostic studies if indicated and discussed diagnosis and management strategies with both the patient and physician  assistant / nurse practitioner.  I reviewed the PA/NP's note and agree with the documented findings and plan of care.     Louise has pain in multiple areas on examination today most pronounced particular about the greater trochanters.  History and physical exam consistent with trochanteric bursitis.  We will proceed with bilateral trochanteric bursa injections today.  We will discuss bilateral knees and hips at that point time to see how she is feeling and if advanced imaging is warranted.  I do think a component here is likely secondary to severe case of fibromyalgia.    Federico Mera III, MD

## 2023-12-01 ENCOUNTER — APPOINTMENT (OUTPATIENT)
Dept: ORTHOPEDIC SURGERY | Facility: CLINIC | Age: 53
End: 2023-12-01
Payer: COMMERCIAL

## 2023-12-27 ENCOUNTER — OFFICE VISIT (OUTPATIENT)
Dept: ORTHOPEDIC SURGERY | Facility: CLINIC | Age: 53
End: 2023-12-27
Payer: COMMERCIAL

## 2023-12-27 DIAGNOSIS — M23.92 ACUTE INTERNAL DERANGEMENT OF LEFT KNEE: ICD-10-CM

## 2023-12-27 PROCEDURE — 20610 DRAIN/INJ JOINT/BURSA W/O US: CPT | Performed by: STUDENT IN AN ORGANIZED HEALTH CARE EDUCATION/TRAINING PROGRAM

## 2023-12-27 PROCEDURE — 2500000004 HC RX 250 GENERAL PHARMACY W/ HCPCS (ALT 636 FOR OP/ED): Performed by: STUDENT IN AN ORGANIZED HEALTH CARE EDUCATION/TRAINING PROGRAM

## 2023-12-27 PROCEDURE — 2500000005 HC RX 250 GENERAL PHARMACY W/O HCPCS: Performed by: STUDENT IN AN ORGANIZED HEALTH CARE EDUCATION/TRAINING PROGRAM

## 2023-12-27 PROCEDURE — 99214 OFFICE O/P EST MOD 30 MIN: CPT | Performed by: STUDENT IN AN ORGANIZED HEALTH CARE EDUCATION/TRAINING PROGRAM

## 2023-12-27 PROCEDURE — 1036F TOBACCO NON-USER: CPT | Performed by: STUDENT IN AN ORGANIZED HEALTH CARE EDUCATION/TRAINING PROGRAM

## 2023-12-27 RX ORDER — TRIAMCINOLONE ACETONIDE 40 MG/ML
40 INJECTION, SUSPENSION INTRA-ARTICULAR; INTRAMUSCULAR
Status: COMPLETED | OUTPATIENT
Start: 2023-12-27 | End: 2023-12-27

## 2023-12-27 RX ORDER — LIDOCAINE HYDROCHLORIDE 10 MG/ML
4 INJECTION INFILTRATION; PERINEURAL
Status: COMPLETED | OUTPATIENT
Start: 2023-12-27 | End: 2023-12-27

## 2023-12-27 RX ADMIN — LIDOCAINE HYDROCHLORIDE 4 ML: 10 INJECTION, SOLUTION INFILTRATION; PERINEURAL at 14:44

## 2023-12-27 RX ADMIN — TRIAMCINOLONE ACETONIDE 40 MG: 40 INJECTION, SUSPENSION INTRA-ARTICULAR; INTRAMUSCULAR at 14:44

## 2023-12-27 ASSESSMENT — PAIN - FUNCTIONAL ASSESSMENT: PAIN_FUNCTIONAL_ASSESSMENT: 0-10

## 2023-12-27 ASSESSMENT — PAIN SCALES - GENERAL: PAINLEVEL_OUTOF10: 8

## 2023-12-27 NOTE — PROGRESS NOTES
Chief Complaint   Patient presents with    Right Knee - Follow-up    Left Knee - Follow-up     B/L Knee pain       HPI  Patient presents today for follow up of bilateral knee pain.  Patient endorses clicking catching popping particular about the left knee.  Presents today in hopes of obtaining bilateral cortisone injections.  Endorses sharp pain occasional episodes of instability particular he has pain with deeper squat type activities feels like there is something pinching in the back of her knee.  Also feel that there is some fullness about the posterior knee consistent with a Baker's cyst      Physical exam  General: Alert and oriented to place, person, and time.  No acute distress and breathing comfortably; pleasant and cooperative with the examination.  Extremity:  Bilateral knee:  Skin healthy and intact  No gross swelling or ecchymosis, palpable Baker's cyst about the posterior aspect left knee  No significant varus or valgus malalignment  Effusion: Mild     ROM:  Full flexion   Full extension  No pain with internal rotation of the hip  Tenderness to palpation: Medial and lateral joint line     No laxity to valgus stress  No laxity to varus stress  Negative Lachman´s test  Negative anterior drawer test  Negative posterior drawer test  Positive Cristela´s test     Neurovascular exam normal distally    Diagnostics:  EGD DIAGNOSTIC    Result Date: 12/5/2023  [facility] Gastrointestinal Endoscopy Patient Name: Louise Sanford Procedure Date: 12/5/2023 11:04 AM MRN: [id number] Account Number: [id number] YOB: 1970 Admit Type: Outpatient Age: 53 Room: [place] [address] Gender: Female Note Status: Finalized Attending MD: Bala Ayers MD Procedure:             Upper GI endoscopy Indications:           Follow-up of esophageal reflux Providers:             Bala Ayers MD Referring Physician:   Bala Ayers MD (Referring MD) Medicines:             Monitored Anesthesia Care, Benzocaine spray  Requesting Provider:   Procedure:             Pre-Anesthesia Assessment:                        - Prior to the procedure, a History and Physical                        was performed, and patient medications and                        allergies were reviewed. The patient's tolerance of                        previous anesthesia was also reviewed. The risks                        and benefits of the procedure and the sedation                        options and risks were discussed with the patient.                        All questions were answered, and informed consent                        was obtained. Prior Anticoagulants: The patient has                        taken no anticoagulant or antiplatelet agents. ASA                        Grade Assessment: II - A patient with mild systemic                        disease. After reviewing the risks and benefits,                        the patient was deemed in satisfactory condition to                        undergo the procedure.                        After obtaining informed consent, the endoscope was                        passed under direct vision. Throughout the                        procedure, the patient's blood pressure, pulse, and                        oxygen saturations were monitored continuously. The                        Endoscope was introduced through the mouth, and                        advanced to the second part of duodenum. The upper                        GI endoscopy was accomplished with ease. The                        patient tolerated the procedure well. Moderate Sedation:      MAC anesthesia was administered by the anesthesia team. Total Procedure Duration: 0 hours 3 minutes 22 seconds Findings:      The hypopharynx was normal.      The examined esophagus was normal. 30 cm from teeth is GE junction -      normal - short esophagus, not tortuous.      A large hiatal hernia was present.      The examined duodenum was normal. Impression:             - Normal hypopharynx.                        - Normal esophagus.                        - Large hiatal hernia.                        - Normal examined duodenum.                        - No specimens collected. Recommendation:        - Discharge patient to home (ambulatory).                        - Resume previous diet.                        - Continue present medications.                        - Patient has a contact number available for                        emergencies. The signs and symptoms of potential                        delayed complications were discussed with the                        patient. Return to normal activities tomorrow.                        Written discharge instructions were provided to the                        patient. Procedure Code(s):     --- Professional ---                        74315 Diagnosis Code(s):     --- Professional ---                        K44.9                        K21.9 CPT copyright 2021 [employer]. All rights reserved. The codes documented in this report are preliminary and upon  review may be revised to meet current compliance requirements. Attending Participation:      I personally performed the entire procedure. Scope In: 11:25:15 AM Scope Out: 11:28:37 AM MD Bala Kinsey MD 12/5/2023 11:34:19 AM This report has been signed electronically by Bala Ayers MD Number of Addenda: 0 Note Initiated On: 12/5/2023 11:04 AM Estimated Blood Loss:      Estimated blood loss: none.       Procedures  L Inj/Asp: bilateral knee on 12/27/2023 2:44 PM  Indications: pain  Details: 22 G needle, anterolateral approach  Medications (Right): 40 mg triamcinolone acetonide 40 mg/mL; 4 mL lidocaine 10 mg/mL (1 %)  Medications (Left): 40 mg triamcinolone acetonide 40 mg/mL; 4 mL lidocaine 10 mg/mL (1 %)  Consent was given by the patient. Immediately prior to procedure a time out was called to verify the correct  patient, procedure, equipment, support staff and site/side marked as required. Patient was prepped and draped in the usual sterile fashion.       Injection was performed as detailed in the procedure note below.     Injection Procedure Note    Before aspiration/injection, the risks of this procedure including but not limited to; infection, local skin irritation, skin atrophy/skin pigmentation changes, calcification, continued pain or discomfort, elevated blood sugar, burning, failure to relieve pain were all discussed with the patient.  Patient verbalized understanding and consented to the procedure.    After informed consent was provided, patient identification was confirmed, and all allergies were verified, the patient was appropriately positioned.  The site was marked and timeout performed.  The injection site was prepped in the usual sterile orthopedic manner with a combination of betadine and alcohol wipes to provide a sterile environment.  Skin was anesthetized with ethyl chloride spray.  The injection was performed with standard technique via the superolateral knee.  The needle was withdrawn and the puncture site was secured with a Band-Aid.  The patient tolerated the procedure well without complication.    Post procedure discomfort can be alleviated with additional medication, ice, elevation, and rest over the first 24 hours is recommended.    The injection site was RIGHT knee joint, medication 4cc of 1% lidocaine, 1cc of 40mg kenalog.  Injection was performed as detailed in the procedure note below.     Injection Procedure Note    Before aspiration/injection, the risks of this procedure including but not limited to; infection, local skin irritation, skin atrophy/skin pigmentation changes, calcification, continued pain or discomfort, elevated blood sugar, burning, failure to relieve pain were all discussed with the patient.  Patient verbalized understanding and consented to the procedure.    After informed  consent was provided, patient identification was confirmed, and all allergies were verified, the patient was appropriately positioned.  The site was marked and timeout performed.  The injection site was prepped in the usual sterile orthopedic manner with a combination of betadine and alcohol wipes to provide a sterile environment.  Skin was anesthetized with ethyl chloride spray.  The injection was performed with standard technique via the superolateral knee.  The needle was withdrawn and the puncture site was secured with a Band-Aid.  The patient tolerated the procedure well without complication.    Post procedure discomfort can be alleviated with additional medication, ice, elevation, and rest over the first 24 hours is recommended.    The injection site was LEFT knee joint, medication 4cc of 1% lidocaine, 1cc of 40mg kenalog.    Assessment:  53-year-old female with concerns for internal derangement left knee/meniscus tear, possible internal derangement right knee    Treatment plan:  Will proceed with bilateral intra-articular injections today  Due to recurrent instability that the patient is having about the left knee we will also obtain a MRI for evaluation of internal derangement patient is already attempted activity modifications, oral anti-inflammatories and injections and has significant disability left worse than right at this point in time  The history and clinical exam are consistent with intraarticular pathology and therefore MRI is medically indicated to evaluate the soft tissues of the joint. Follow up in one week or after completion of the MRI to advance management accordingly  The patient understands and agrees with the plan.  I discussed risks and benefits of corticosteroid injection and the patient would like to proceed.  Discussed risks of skin depigmentation and the rare but possible intravascular injection of local anesthetic which can cause palpitations or arrhythmias. I discussed the  possibility of a transient increase in blood sugar. I explained that the local anesthetic tends to work immediately, it may take 2-3 days for the full effect of the corticosteroid compound. Consent was obtained and side confirmed by the patient.  All of the patient's questions concerns answered

## 2024-01-11 ENCOUNTER — ANCILLARY PROCEDURE (OUTPATIENT)
Dept: RADIOLOGY | Facility: CLINIC | Age: 54
End: 2024-01-11
Payer: COMMERCIAL

## 2024-01-11 DIAGNOSIS — M23.92 ACUTE INTERNAL DERANGEMENT OF LEFT KNEE: ICD-10-CM

## 2024-01-11 DIAGNOSIS — F32.A DEPRESSIVE DISORDER: ICD-10-CM

## 2024-01-11 PROCEDURE — 73721 MRI JNT OF LWR EXTRE W/O DYE: CPT | Mod: LT

## 2024-01-11 PROCEDURE — 73721 MRI JNT OF LWR EXTRE W/O DYE: CPT | Mod: LEFT SIDE | Performed by: RADIOLOGY

## 2024-01-11 RX ORDER — DULOXETIN HYDROCHLORIDE 60 MG/1
60 CAPSULE, DELAYED RELEASE ORAL DAILY
Qty: 90 CAPSULE | Refills: 1 | Status: SHIPPED | OUTPATIENT
Start: 2024-01-11 | End: 2024-05-01 | Stop reason: WASHOUT

## 2024-01-19 ENCOUNTER — OFFICE VISIT (OUTPATIENT)
Dept: ORTHOPEDIC SURGERY | Facility: CLINIC | Age: 54
End: 2024-01-19
Payer: COMMERCIAL

## 2024-01-19 VITALS — BODY MASS INDEX: 31.01 KG/M2 | WEIGHT: 175 LBS | HEIGHT: 63 IN

## 2024-01-19 DIAGNOSIS — M23.92 ACUTE INTERNAL DERANGEMENT OF LEFT KNEE: Primary | ICD-10-CM

## 2024-01-19 PROCEDURE — 99214 OFFICE O/P EST MOD 30 MIN: CPT | Performed by: STUDENT IN AN ORGANIZED HEALTH CARE EDUCATION/TRAINING PROGRAM

## 2024-01-19 PROCEDURE — 1036F TOBACCO NON-USER: CPT | Performed by: STUDENT IN AN ORGANIZED HEALTH CARE EDUCATION/TRAINING PROGRAM

## 2024-01-19 ASSESSMENT — PAIN SCALES - GENERAL: PAINLEVEL_OUTOF10: 4

## 2024-01-19 ASSESSMENT — PAIN - FUNCTIONAL ASSESSMENT: PAIN_FUNCTIONAL_ASSESSMENT: 0-10

## 2024-01-20 NOTE — PROGRESS NOTES
Chief Complaint   Patient presents with    Left Knee - Follow-up     MRI review   1. Left knee internal derangement, possible meniscus tear.   S/P- Cortisone inj 12/27/23  MRI done 1/11/24        HPI  Patient presents today for follow up of left knee MRI review.  Patient continues to have pain discomfort about her knee.  Worsened with movement relieved with rest.  Endorses occasional clicking catching popping as well and primarily about the posterior aspect of the knee.  Tired of feeling this way has attempted physician directed home exercises, cortisone injection, or oral anti-inflammatories, bracing with.      Physical exam  General: Alert and oriented to place, person, and time.  No acute distress and breathing comfortably; pleasant and cooperative with the examination.  Extremity:  Left knee:  Skin healthy and intact  No gross swelling or ecchymosis  No significant varus or valgus malalignment  Effusion: Mild     ROM:  Full flexion   Full extension  No pain with internal rotation of the hip  Tenderness to palpation: Medial lateral joint line as well as posterior leg and posterior knee     No laxity to valgus stress  No laxity to varus stress  Negative Lachman´s test  Negative anterior drawer test  Negative posterior drawer test  Positive Cristela´s test     Neurovascular exam normal distally    Diagnostics:  MR knee left wo IV contrast    Result Date: 1/11/2024  Interpreted By:  Blaze Romo, STUDY: MR KNEE LEFT WO IV CONTRAST;  1/11/2024 2:52 pm   INDICATION: Signs/Symptoms:pain. Posterior knee pain and tightness. Pain with weight-bearing. No previous surgery.   COMPARISON: Plain film examination of 10/27/2023   ACCESSION NUMBER(S): [id number]   ORDERING CLINICIAN: EDDI BELLA   TECHNIQUE: Multiplanar and multisequential MR images of the left knee are performed.   FINDINGS: There is a small joint effusion. There is some ill-defined fluid partially surrounding the vastus medialis muscle belly  distally extending between the muscle and distal femur.   There is no sign of acute osseous fracture, bone marrow edema, or osseous mass. There is no focal defect of the articular cartilage or loose osteochondral lesion.   The medial meniscus is of normal morphology. There is no linear meniscal tear or truncation.   The lateral meniscus is of normal morphology. There is no linear tear or truncation.   The anterior cruciate ligament is mildly thickened with some intermediate to increased intrasubstance signal in the proximal 2/3 of the ligament. There is some surrounding edema and trace fluid. These findings are compatible with grade 2 sprain. There is no full-thickness discontinuity.   The posterior cruciate ligament, lateral collateral ligament complex, popliteus tendon, medial collateral ligament, extensor complex, and patellar retinacula are intact.   The surrounding soft tissues are unremarkable.       Suspect grade 2 sprain of the anterior cruciate ligament or less likely mucoid degeneration. There is no full-thickness tear.   Small joint effusion with some fluid tracking superiorly between the vastus medialis and distal femur. This finding may be posttraumatic.   No evidence of meniscal tear.   No acute osseous abnormality.   Signed by: Blaze Romo 1/11/2024 3:07 PM Dictation workstation:   [id number]       Procedures  Procedures     Assessment:  53-year-old female with concerns for internal derangement not easily readily apparent on MRI    Treatment plan:  Patient is tired of feeling this way has failed multiple forms of conservative treatment to include activity modifications, bracing, injections, physician directed home exercises.  Risk benefits alternative treatment discussed.  She does wish to proceed with diagnostic arthroscopy.  Does have upcoming procedure therefore does want to push this back until she does recover.  I will have her follow-up in about a month to see how she has recovered  from her procedure prior to proceeding with any type of surgical intervention.  In the meantime range of motion as tolerated tibias as tolerated using pain as a guide  Continue physician directed home exercises, activity modifications  All of the patient's questions concerns answered    I spent 35 minutes in the visit which includes: Face-to-face and non-face-to-face time working for this specific patient.  All time was on the date of service.  Total time in activities performed include the following: Preparing to see the patient(e.g., review of test, previous progress notes of mine or another provider; obtaining and/or reviewing separately obtained history; performing a medically appropriate examination and/or counseling and educating the patient/family/caregiver; ordering medications, tests or procedures; referring and communicating with other healthcare professionals (not separately reported); documenting clinical information in the electronic and/or health record; independently interpreting results (not separately reported) and communicating results to the patient family caregiver care coordination (not separately reported).

## 2024-02-16 ENCOUNTER — APPOINTMENT (OUTPATIENT)
Dept: ORTHOPEDIC SURGERY | Facility: CLINIC | Age: 54
End: 2024-02-16
Payer: COMMERCIAL

## 2024-04-17 ENCOUNTER — OFFICE VISIT (OUTPATIENT)
Dept: ORTHOPEDIC SURGERY | Facility: CLINIC | Age: 54
End: 2024-04-17
Payer: COMMERCIAL

## 2024-04-17 DIAGNOSIS — Z87.828 HX OF SPRAIN OF ANKLE: ICD-10-CM

## 2024-04-17 DIAGNOSIS — M23.92 ACUTE INTERNAL DERANGEMENT OF LEFT KNEE: Primary | ICD-10-CM

## 2024-04-17 PROCEDURE — 99214 OFFICE O/P EST MOD 30 MIN: CPT | Mod: 57 | Performed by: STUDENT IN AN ORGANIZED HEALTH CARE EDUCATION/TRAINING PROGRAM

## 2024-04-17 PROCEDURE — 1036F TOBACCO NON-USER: CPT | Performed by: STUDENT IN AN ORGANIZED HEALTH CARE EDUCATION/TRAINING PROGRAM

## 2024-04-17 PROCEDURE — L1902 AFO ANKLE GAUNTLET PRE OTS: HCPCS | Performed by: STUDENT IN AN ORGANIZED HEALTH CARE EDUCATION/TRAINING PROGRAM

## 2024-04-17 PROCEDURE — 99214 OFFICE O/P EST MOD 30 MIN: CPT | Performed by: STUDENT IN AN ORGANIZED HEALTH CARE EDUCATION/TRAINING PROGRAM

## 2024-04-17 RX ORDER — HYDROCODONE BITARTRATE AND ACETAMINOPHEN 5; 325 MG/1; MG/1
1 TABLET ORAL EVERY 6 HOURS PRN
Qty: 21 TABLET | Refills: 0 | Status: SHIPPED | OUTPATIENT
Start: 2024-04-17 | End: 2024-04-24

## 2024-04-17 RX ORDER — OMEPRAZOLE 20 MG/1
20 CAPSULE, DELAYED RELEASE ORAL
COMMUNITY
Start: 2024-01-11 | End: 2024-05-01 | Stop reason: WASHOUT

## 2024-04-17 ASSESSMENT — PAIN - FUNCTIONAL ASSESSMENT: PAIN_FUNCTIONAL_ASSESSMENT: 0-10

## 2024-04-17 NOTE — PROGRESS NOTES
Chief Complaint   Patient presents with    Left Knee - Follow-up     Internal derangement    Right Ankle - Pain     3/24/24 (ankle buckled while wearing heels, dog went through legs, (3 weeks)  7 pain scale       HPI  Patient presents today for follow up of left knee.  Continues to have posterior medial knee pain sharp pain difficult time sleeping about her knee.  Has attempted multiple forms of conservative treatment including activity modifications anti-inflammatories, braces, cortisone injection.  Wish to proceed with diagnostic knee arthroscopy.    Regarding the right ankle ankle she rolled her ankle about 3 weeks ago 3/24/2024 while wearing heels.  Has been having some lateral based ankle pain however this has improved comparison to where it was.  To get x-rays at local urgent care which were negative for fracture.  Presents today for orthopedic treatment.      Physical exam  General: Alert and oriented to place, person, and time.  No acute distress and breathing comfortably; pleasant and cooperative with the examination.  Extremity:  Left knee: Palpable Baker's cyst about the posterior knee  Skin healthy and intact  No gross swelling or ecchymosis  No significant varus or valgus malalignment  Effusion: Mild     ROM:  Full flexion   Full extension  No pain with internal rotation of the hip  Tenderness to palpation: Medial lateral joint line as well as posterior leg and posterior knee     No laxity to valgus stress  No laxity to varus stress  Negative Lachman´s test  Negative anterior drawer test  Negative posterior drawer test  Positive Cristela´s test     Neurovascular exam normal distally    Right ankle:     Skin healthy and intact  Swelling: None  No appreciable effusion   No significant pes planus or cavus  Plantar flexion, dorsiflexion, and inversion/eversion symmetric with contralateral side     Moderate tenderness to palpation over lateral ligamentous complex  No tenderness to palpation over deltoid  ligament  No tenderness to palpation over lateral malleolus  No tenderness to palpation over medial malleolus  No tenderness to palpation over achilles tendon     No weakness with resisted dorsiflexion, plantar flexion, inversion or eversion  Negative squeeze test  Negative talar tilt    Motor and sensory exam normal distally  Good capillary refill    Diagnostics:  MR knee left wo IV contrast    Result Date: 1/11/2024  Interpreted By:  Blaze Romo, STUDY: MR KNEE LEFT WO IV CONTRAST;  1/11/2024 2:52 pm   INDICATION: Signs/Symptoms:pain. Posterior knee pain and tightness. Pain with weight-bearing. No previous surgery.   COMPARISON: Plain film examination of 10/27/2023   ACCESSION NUMBER(S): [id number]   ORDERING CLINICIAN: EDDI BELLA   TECHNIQUE: Multiplanar and multisequential MR images of the left knee are performed.   FINDINGS: There is a small joint effusion. There is some ill-defined fluid partially surrounding the vastus medialis muscle belly distally extending between the muscle and distal femur.   There is no sign of acute osseous fracture, bone marrow edema, or osseous mass. There is no focal defect of the articular cartilage or loose osteochondral lesion.   The medial meniscus is of normal morphology. There is no linear meniscal tear or truncation.   The lateral meniscus is of normal morphology. There is no linear tear or truncation.   The anterior cruciate ligament is mildly thickened with some intermediate to increased intrasubstance signal in the proximal 2/3 of the ligament. There is some surrounding edema and trace fluid. These findings are compatible with grade 2 sprain. There is no full-thickness discontinuity.   The posterior cruciate ligament, lateral collateral ligament complex, popliteus tendon, medial collateral ligament, extensor complex, and patellar retinacula are intact.   The surrounding soft tissues are unremarkable.       Suspect grade 2 sprain of the anterior cruciate  ligament or less likely mucoid degeneration. There is no full-thickness tear.   Small joint effusion with some fluid tracking superiorly between the vastus medialis and distal femur. This finding may be posttraumatic.   No evidence of meniscal tear.   No acute osseous abnormality.   Signed by: Blaze Romo 1/11/2024 3:07 PM Dictation workstation:   [id number]       Procedures  Procedures     Assessment:  53-year-old female with concerns for internal derangement not easily readily apparent on MRI, Baker's cyst, right ankle Lisfranc    Treatment plan:  Constellation of findings discussed with patient detail risk benefits alternative treatment also discussed.  Using shared informed significant patient was to proceed with diagnostic knee arthroscopy.  Will obtain medical clearance prior to proceeding    We discussed the options of operative versus nonoperative management with the attendant risks and benefits and the patient is interested in surgical treatment. I have discussed the alternatives of continued nonoperative treatment with observation, physical therapy, and / or medication versus surgery with the patient and we have thoughtfully considered these options. The patient wishes to proceed with surgical treatment.     We will proceed with left knee diagnostic arthroscopy    The planned surgical procedure includes examination under anesthesia. Anesthetic risks will be discussed with the patient by the anesthetist. Arthroscopic evaluation will be performed of the knee joint.  Then an arthroscopic procedure will be performed which may include debridement or repair of the the meniscus or cartilage, synovectomy, and removal of loose bodies.  In addition, if there are advanced degenerative changes I have advised the patient that this may indeed be a progressive process, ultimately resulting in further pain at some point in the future.    Risks of the procedure include but are not limited to infection, bleeding,  persistent pain, compartment syndrome, neurologic injury, pressure sores or burns related to positioning or equipment, failure of repair if performed, weakness, arthorfibrosis or stiffness of the joint, limited function and/or limited use of the extremity. The rare complication of death was discussed with the patient. Also, the possible need for revision surgery was discussed.     All this was discussed with the patient and consent obtained. All questions were answered. The patient understands and will consider the surgical options with the above risks in mind. If repair is performed of the meniscus a brace may be provided as  part of a treatment plan which will include wearing the immobilizer at all times.    Patient was given Norco for postoperative pain control.  We will pick this up prior to surgery so that they are not looking for during the day of surgery. I have personally reviewed the OARRS report for this patient. This report is scanned into  the electronic medical record. I have considered the risks of abuse, dependence, addiction, and diversion. They currently report a pain of 8.    Regarding DVT prophylaxis, recommend generalized ambulation    Constellation of findings regarding right ankle consistent with an media.  Low ankle sprain.  Will provide a lace up ankle brace today.  Discussed importance of rest ice compression and elevation.  Will have her repeat evaluation at postoperative visit.

## 2024-05-01 ENCOUNTER — OFFICE VISIT (OUTPATIENT)
Dept: PRIMARY CARE | Facility: CLINIC | Age: 54
End: 2024-05-01
Payer: COMMERCIAL

## 2024-05-01 VITALS
OXYGEN SATURATION: 99 % | DIASTOLIC BLOOD PRESSURE: 78 MMHG | HEART RATE: 101 BPM | BODY MASS INDEX: 31.71 KG/M2 | TEMPERATURE: 97.8 F | RESPIRATION RATE: 16 BRPM | HEIGHT: 63 IN | SYSTOLIC BLOOD PRESSURE: 104 MMHG | WEIGHT: 179 LBS

## 2024-05-01 DIAGNOSIS — R63.5 WEIGHT GAIN: ICD-10-CM

## 2024-05-01 DIAGNOSIS — F43.9 STRESS: Primary | ICD-10-CM

## 2024-05-01 DIAGNOSIS — E55.9 VITAMIN D DEFICIENCY: ICD-10-CM

## 2024-05-01 DIAGNOSIS — E78.5 HYPERLIPIDEMIA, UNSPECIFIED HYPERLIPIDEMIA TYPE: ICD-10-CM

## 2024-05-01 DIAGNOSIS — R53.83 FATIGUE, UNSPECIFIED TYPE: ICD-10-CM

## 2024-05-01 DIAGNOSIS — R73.9 HYPERGLYCEMIA: ICD-10-CM

## 2024-05-01 DIAGNOSIS — E78.5 HYPERLIPIDEMIA, UNSPECIFIED: ICD-10-CM

## 2024-05-01 PROBLEM — E87.6 HYPOKALEMIA: Status: ACTIVE | Noted: 2024-01-11

## 2024-05-01 PROBLEM — R06.83 SNORES: Status: ACTIVE | Noted: 2024-01-11

## 2024-05-01 PROBLEM — M70.62 TROCHANTERIC BURSITIS OF BOTH HIPS: Status: ACTIVE | Noted: 2024-05-01

## 2024-05-01 PROBLEM — M70.61 TROCHANTERIC BURSITIS OF BOTH HIPS: Status: ACTIVE | Noted: 2024-05-01

## 2024-05-01 PROBLEM — M23.92 DERANGEMENT OF LEFT KNEE: Status: ACTIVE | Noted: 2024-05-01

## 2024-05-01 PROBLEM — R73.03 PREDIABETES: Status: ACTIVE | Noted: 2024-01-11

## 2024-05-01 PROCEDURE — 99214 OFFICE O/P EST MOD 30 MIN: CPT | Performed by: FAMILY MEDICINE

## 2024-05-01 RX ORDER — ROSUVASTATIN CALCIUM 20 MG/1
20 TABLET, COATED ORAL DAILY
Qty: 90 TABLET | Refills: 3 | Status: SHIPPED | OUTPATIENT
Start: 2024-05-01 | End: 2024-05-09 | Stop reason: SDUPTHER

## 2024-05-01 RX ORDER — SEMAGLUTIDE 0.25 MG/.5ML
0.25 INJECTION, SOLUTION SUBCUTANEOUS
Qty: 2 ML | Refills: 0 | Status: SHIPPED | OUTPATIENT
Start: 2024-05-05 | End: 2024-05-27

## 2024-05-01 NOTE — PROGRESS NOTES
Subjective   Patient ID: Louise Sanford is a 53 y.o. female who presents for Knee Pain.    HPI   Patient is here for ankle pain and upcoming knee surgery to be done on 5/16/24 with Dr. Mera. Pre-op will be done on 5/10/24.  New prescription needed for Rosuvastatin.   Patient is not taking any other medications.  Patient would like to discuss getting future appointment to get blood work done.    Review of Systems  12 Systems have been reviewed as follows.  Constitutional: Fever, weight gain, weight loss, appetite change, night sweats, fatigue, chills.  Eyes : blurry, double vision, vision, loss, tearing, redness, pain, sensitivity to light, glaucoma.  Ears, nose, mouth, and throat: Hearing loss, ringing in the ears, ear pain, nasal congestion, nasal drainage, nosebleeds, mouth, throat, irritation tooth problem.  Cardiovascular :chest pain, pressure, heart racing, palpitations, sweating, leg swelling, high or low blood pressure  Pulmonary: Cough, yellow or green sputum, blood and sputum, shortness of breath, wheezing  Gastrointestinal: Nausea, vomiting, diarrhea, constipation, pain, blood in stool, or vomitus, heartburn, difficulty swallowing  Genitourinary: incontinence, abnormal bleeding, abnormal discharge, urinary frequency, urinary hesitancy, pain, impotence sexual problem, infection, urinary retention  Musculoskeletal: Pain, stiffness, joint, redness or warmth, arthritis, back pain, weakness, muscle wasting, sprain or fracture  Neuro: Weight weakness, dizziness, change in voice, change in taste change in vision, change in hearing, loss, or change of sensation, trouble walking, balance problems coordination problems, shaking, speech problem  Endocrine , cold or heat intolerance, blood sugar problem, weight gain or loss missed periods hot flashes, sweats, change in body hair, change in libido, increased thirst, increased urination  Heme/lymph: Swelling, bleeding, problem anemia, bruising, enlarged lymph  "nodes  Allergic/immunologic: H. plus nasal drip, watery itchy eyes, nasal drainage, immunosuppressed  The above were reviewed and noted negative except as noted in HPI and Problem List.      Objective   /78 (BP Location: Left arm, Patient Position: Sitting, BP Cuff Size: Adult)   Pulse 101   Temp 36.6 °C (97.8 °F) (Temporal)   Resp 16   Ht 1.6 m (5' 3\")   Wt 81.2 kg (179 lb)   SpO2 99%   BMI 31.71 kg/m²     Physical Exam  Constitutional: Well developed, well nourished, alert and in no acute distress   Eyes: Normal external exam. Pupils equally round and reactive to light with normal accommodation and extraocular movements intact.  Neck: Supple, no lymphadenopathy or masses.   Cardiovascular: Regular rate and rhythm, normal S1 and S2, no murmurs, gallops, or rubs. Radial pulses normal. No peripheral edema.  Pulmonary: No respiratory distress, lungs clear to auscultation bilaterally. No wheezes, rhonchi, rales.  Abdomen: soft,non tender, non distended, without masses or HSM  Skin: Warm, well perfused, normal skin turgor and color.   Neurologic: Cranial nerves II-XII grossly intact.   Psychiatric: Mood calm and affect normal  Musculoskeletal: Moving all extremities without restriction    Assessment/Plan   Problem List Items Addressed This Visit             ICD-10-CM    Hyperglycemia R73.9    Relevant Orders    Hemoglobin A1C    Follow Up In [facility]acy    Follow Up [facility] - PCP - Established    Weight gain R63.5    Relevant Medications    semaglutide, weight loss, (Wegovy) 0.25 mg/0.5 mL pen injector (Start on 5/5/2024)    Other Relevant Orders    Follow Up [facility]    Follow Up [facility] - PCP - Established    Hyperlipidemia E78.5    Relevant Orders    CBC and Auto Differential    Comprehensive Metabolic Panel    Lipid Panel    Follow Up [facility] - PCP - Established    Fatigue R53.83    Relevant Orders    " Thyroid Stimulating Hormone    Follow Up [facility]    Follow Up [facility] - PCP - Established     Other Visit Diagnoses         Codes    Stress    -  Primary F43.9    Relevant Orders    Follow Up [facility] - PCP - Established    Hyperlipidemia, unspecified     E78.5    Relevant Medications    rosuvastatin (Crestor) 20 mg tablet    Other Relevant Orders    Follow Up [facility]    Follow Up [facility] - PCP - Established    CBC and Auto Differential    Comprehensive Metabolic Panel    Lipid Panel    Follow Up [facility] - PCP - Established    Vitamin D deficiency     E55.9    Relevant Orders    Vitamin D 25-Hydroxy,Total (for eval of Vitamin D levels)    Follow Up In [facility] - PCP - Established          Wegovy    See orders     Scribe Attestation  By signing my name below, I, Sanchez Hyman MA, Scribe   attest that this documentation has been prepared under the direction and in the presence of Javon Barragan MD.    Provider Attestation - Scribe documentation    All medical record entries made by the Scribe were at my direction and personally dictated by me. I have reviewed the chart and agree that the record accurately reflects my personal performance of the history, physical exam, discussion and plan.    Continue current medications and therapy for chronic medical conditions

## 2024-05-03 ENCOUNTER — LAB (OUTPATIENT)
Dept: LAB | Facility: LAB | Age: 54
End: 2024-05-03
Payer: COMMERCIAL

## 2024-05-03 DIAGNOSIS — R53.83 FATIGUE, UNSPECIFIED TYPE: ICD-10-CM

## 2024-05-03 DIAGNOSIS — R73.9 HYPERGLYCEMIA: ICD-10-CM

## 2024-05-03 DIAGNOSIS — E78.5 HYPERLIPIDEMIA, UNSPECIFIED HYPERLIPIDEMIA TYPE: ICD-10-CM

## 2024-05-03 DIAGNOSIS — E55.9 VITAMIN D DEFICIENCY: ICD-10-CM

## 2024-05-03 LAB
25(OH)D3 SERPL-MCNC: 42 NG/ML (ref 30–100)
ALBUMIN SERPL BCP-MCNC: 4 G/DL (ref 3.4–5)
ALP SERPL-CCNC: 89 U/L (ref 33–110)
ALT SERPL W P-5'-P-CCNC: 19 U/L (ref 7–45)
ANION GAP SERPL CALC-SCNC: 12 MMOL/L (ref 10–20)
AST SERPL W P-5'-P-CCNC: 16 U/L (ref 9–39)
BASOPHILS # BLD AUTO: 0.03 X10*3/UL (ref 0–0.1)
BASOPHILS NFR BLD AUTO: 0.5 %
BILIRUB SERPL-MCNC: 0.4 MG/DL (ref 0–1.2)
BUN SERPL-MCNC: 14 MG/DL (ref 6–23)
CALCIUM SERPL-MCNC: 8.8 MG/DL (ref 8.6–10.3)
CHLORIDE SERPL-SCNC: 104 MMOL/L (ref 98–107)
CHOLEST SERPL-MCNC: 282 MG/DL (ref 0–199)
CHOLESTEROL/HDL RATIO: 6.7
CO2 SERPL-SCNC: 28 MMOL/L (ref 21–32)
CREAT SERPL-MCNC: 0.92 MG/DL (ref 0.5–1.05)
EGFRCR SERPLBLD CKD-EPI 2021: 74 ML/MIN/1.73M*2
EOSINOPHIL # BLD AUTO: 0.1 X10*3/UL (ref 0–0.7)
EOSINOPHIL NFR BLD AUTO: 1.6 %
ERYTHROCYTE [DISTWIDTH] IN BLOOD BY AUTOMATED COUNT: 13.2 % (ref 11.5–14.5)
EST. AVERAGE GLUCOSE BLD GHB EST-MCNC: 128 MG/DL
GLUCOSE SERPL-MCNC: 102 MG/DL (ref 74–99)
HBA1C MFR BLD: 6.1 %
HCT VFR BLD AUTO: 40.8 % (ref 36–46)
HDLC SERPL-MCNC: 41.8 MG/DL
HGB BLD-MCNC: 13.3 G/DL (ref 12–16)
IMM GRANULOCYTES # BLD AUTO: 0.02 X10*3/UL (ref 0–0.7)
IMM GRANULOCYTES NFR BLD AUTO: 0.3 % (ref 0–0.9)
LDLC SERPL CALC-MCNC: 176 MG/DL
LYMPHOCYTES # BLD AUTO: 2 X10*3/UL (ref 1.2–4.8)
LYMPHOCYTES NFR BLD AUTO: 31.1 %
MCH RBC QN AUTO: 28.5 PG (ref 26–34)
MCHC RBC AUTO-ENTMCNC: 32.6 G/DL (ref 32–36)
MCV RBC AUTO: 87 FL (ref 80–100)
MONOCYTES # BLD AUTO: 0.45 X10*3/UL (ref 0.1–1)
MONOCYTES NFR BLD AUTO: 7 %
NEUTROPHILS # BLD AUTO: 3.84 X10*3/UL (ref 1.2–7.7)
NEUTROPHILS NFR BLD AUTO: 59.5 %
NON HDL CHOLESTEROL: 240 MG/DL (ref 0–149)
NRBC BLD-RTO: 0 /100 WBCS (ref 0–0)
PLATELET # BLD AUTO: 336 X10*3/UL (ref 150–450)
POTASSIUM SERPL-SCNC: 4.6 MMOL/L (ref 3.5–5.3)
PROT SERPL-MCNC: 6.6 G/DL (ref 6.4–8.2)
RBC # BLD AUTO: 4.67 X10*6/UL (ref 4–5.2)
SODIUM SERPL-SCNC: 139 MMOL/L (ref 136–145)
TRIGL SERPL-MCNC: 319 MG/DL (ref 0–149)
TSH SERPL-ACNC: 2.11 MIU/L (ref 0.44–3.98)
VLDL: 64 MG/DL (ref 0–40)
WBC # BLD AUTO: 6.4 X10*3/UL (ref 4.4–11.3)

## 2024-05-03 PROCEDURE — 83036 HEMOGLOBIN GLYCOSYLATED A1C: CPT

## 2024-05-03 PROCEDURE — 84443 ASSAY THYROID STIM HORMONE: CPT

## 2024-05-03 PROCEDURE — 82306 VITAMIN D 25 HYDROXY: CPT

## 2024-05-03 PROCEDURE — 80053 COMPREHEN METABOLIC PANEL: CPT

## 2024-05-03 PROCEDURE — 85025 COMPLETE CBC W/AUTO DIFF WBC: CPT

## 2024-05-03 PROCEDURE — 80061 LIPID PANEL: CPT

## 2024-05-03 PROCEDURE — 36415 COLL VENOUS BLD VENIPUNCTURE: CPT

## 2024-05-09 ENCOUNTER — LAB (OUTPATIENT)
Dept: LAB | Facility: LAB | Age: 54
End: 2024-05-09
Payer: COMMERCIAL

## 2024-05-09 ENCOUNTER — HOSPITAL ENCOUNTER (OUTPATIENT)
Dept: RADIOLOGY | Facility: CLINIC | Age: 54
Discharge: HOME | End: 2024-05-09
Payer: COMMERCIAL

## 2024-05-09 ENCOUNTER — OFFICE VISIT (OUTPATIENT)
Dept: PRIMARY CARE | Facility: CLINIC | Age: 54
End: 2024-05-09
Payer: COMMERCIAL

## 2024-05-09 VITALS
HEART RATE: 95 BPM | DIASTOLIC BLOOD PRESSURE: 78 MMHG | HEIGHT: 63 IN | OXYGEN SATURATION: 99 % | SYSTOLIC BLOOD PRESSURE: 120 MMHG | TEMPERATURE: 98 F | RESPIRATION RATE: 16 BRPM | WEIGHT: 178 LBS | BODY MASS INDEX: 31.54 KG/M2

## 2024-05-09 DIAGNOSIS — Z01.818 PRE-OP EXAM: ICD-10-CM

## 2024-05-09 DIAGNOSIS — E78.5 HYPERLIPIDEMIA, UNSPECIFIED: ICD-10-CM

## 2024-05-09 DIAGNOSIS — J00 NASOPHARYNGITIS ACUTE: Primary | ICD-10-CM

## 2024-05-09 DIAGNOSIS — E66.9 CLASS 1 OBESITY WITH BODY MASS INDEX (BMI) OF 31.0 TO 31.9 IN ADULT, UNSPECIFIED OBESITY TYPE, UNSPECIFIED WHETHER SERIOUS COMORBIDITY PRESENT: ICD-10-CM

## 2024-05-09 LAB
APTT PPP: 34 SECONDS (ref 27–38)
INR PPP: 1 (ref 0.9–1.1)
POC APPEARANCE, URINE: CLEAR
POC BILIRUBIN, URINE: NEGATIVE
POC BLOOD, URINE: NEGATIVE
POC COLOR, URINE: YELLOW
POC GLUCOSE, URINE: NEGATIVE MG/DL
POC KETONES, URINE: NEGATIVE MG/DL
POC LEUKOCYTES, URINE: ABNORMAL
POC NITRITE,URINE: NEGATIVE
POC PH, URINE: 6 PH
POC PROTEIN, URINE: NEGATIVE MG/DL
POC SPECIFIC GRAVITY, URINE: >=1.03
POC UROBILINOGEN, URINE: 0.2 EU/DL
PROTHROMBIN TIME: 11.1 SECONDS (ref 9.8–12.8)

## 2024-05-09 PROCEDURE — 99396 PREV VISIT EST AGE 40-64: CPT | Performed by: NURSE PRACTITIONER

## 2024-05-09 PROCEDURE — 71046 X-RAY EXAM CHEST 2 VIEWS: CPT

## 2024-05-09 PROCEDURE — 93000 ELECTROCARDIOGRAM COMPLETE: CPT | Performed by: NURSE PRACTITIONER

## 2024-05-09 PROCEDURE — 81002 URINALYSIS NONAUTO W/O SCOPE: CPT | Performed by: NURSE PRACTITIONER

## 2024-05-09 PROCEDURE — 87081 CULTURE SCREEN ONLY: CPT

## 2024-05-09 PROCEDURE — 85610 PROTHROMBIN TIME: CPT

## 2024-05-09 PROCEDURE — 87086 URINE CULTURE/COLONY COUNT: CPT

## 2024-05-09 PROCEDURE — 85730 THROMBOPLASTIN TIME PARTIAL: CPT

## 2024-05-09 PROCEDURE — 36415 COLL VENOUS BLD VENIPUNCTURE: CPT

## 2024-05-09 RX ORDER — ROSUVASTATIN CALCIUM 20 MG/1
20 TABLET, COATED ORAL DAILY
Qty: 90 TABLET | Refills: 3 | Status: SHIPPED | OUTPATIENT
Start: 2024-05-09

## 2024-05-09 RX ORDER — AZITHROMYCIN 250 MG/1
TABLET, FILM COATED ORAL DAILY
Qty: 6 TABLET | Refills: 0 | Status: SHIPPED | OUTPATIENT
Start: 2024-05-09 | End: 2024-05-13

## 2024-05-09 ASSESSMENT — ENCOUNTER SYMPTOMS
SHORTNESS OF BREATH: 0
DIZZINESS: 0
WEAKNESS: 0
HEADACHES: 0
PALPITATIONS: 0
FEVER: 0
COUGH: 1
CHILLS: 0

## 2024-05-09 NOTE — PROGRESS NOTES
Subjective   Patient ID: Louise Sanford is a 53 y.o. female who presents for Pre-op Exam.    Patient presents in office for a pre op exam. Patient will be having a left knee arthroscopy. Patient will be having this procedure performed by Dr. Mera at [facility] on 05/16/2024. Patient has been placed under general anesthesia and denies any complications. Patient has never had a blood transfusion.    Patient presents in office with a complaint of a sinus infection. Patient admits she has been experiencing sinus drainage, nasal congestion and a productive cough with dark green phlegm. Patient has been experiencing symptoms for about three days. Patient has tried otc Sudafed and Zyrtec which she feels provided minimal relief.       53 year old female (PMH: Pre-DM, GERD, Anxiety/Depression Migraines) presents today for a preoperative exam. She is having a left knee arthroscopy on 5/16/24. She denies any previous adverse reactions to anesthesia. She denies any history of ischemic heart disease, heart failure, CVD, or renal dysfunction. She does report of history of Pre-diabetes. Her labs Ha1c was 6.1%.  She is also complaining of a possible sinus infection. Over the past 4 days she has been experiencing nasal congestion, sinus pain/pressure, a cough and post nasal drip. She denies any fever/chills. No chest pains or trouble breathing. She denies smoking/vaping. She does report a histoy of allery induced asthma. No ill contacts. She has been taking zyrtec and flonase with little relief.       Review of Systems   Constitutional:  Negative for chills, fatigue and fever.   HENT:  Positive for congestion, postnasal drip, sinus pressure and sinus pain. Negative for ear pain and sore throat.    Eyes:  Negative for pain, redness and visual disturbance.   Respiratory:  Positive for cough. Negative for shortness of breath and wheezing.    Cardiovascular:  Negative for chest pain and palpitations.  "  Gastrointestinal:  Negative for abdominal pain, diarrhea, nausea and vomiting.   Genitourinary:  Negative for dysuria, frequency and urgency.   Musculoskeletal:  Negative for myalgias.   Skin:  Negative for rash.   Allergic/Immunologic: Positive for environmental allergies.   Neurological:  Negative for dizziness, weakness and headaches.   Psychiatric/Behavioral:  Negative for dysphoric mood. The patient is not nervous/anxious.        Objective   /78 (BP Location: Left arm, Patient Position: Sitting, BP Cuff Size: Adult)   Pulse 95   Temp 36.7 °C (98 °F) (Temporal)   Resp 16   Ht 1.6 m (5' 3\")   Wt 80.7 kg (178 lb)   SpO2 99%   BMI 31.53 kg/m²     Physical Exam  Vitals and nursing note reviewed.   Constitutional:       General: She is not in acute distress.     Appearance: Normal appearance. She is obese.   HENT:      Head: Normocephalic and atraumatic.      Right Ear: Tympanic membrane normal.      Left Ear: Tympanic membrane normal.      Nose: Congestion present.   Eyes:      Extraocular Movements: Extraocular movements intact.      Conjunctiva/sclera: Conjunctivae normal.      Pupils: Pupils are equal, round, and reactive to light.   Cardiovascular:      Rate and Rhythm: Normal rate and regular rhythm.      Heart sounds: Normal heart sounds.   Pulmonary:      Effort: Pulmonary effort is normal.      Breath sounds: Normal breath sounds. No wheezing, rhonchi or rales.   Abdominal:      General: Abdomen is flat. Bowel sounds are normal.      Palpations: Abdomen is soft.      Tenderness: There is no abdominal tenderness.   Musculoskeletal:      Right lower leg: No edema.      Left lower leg: No edema.   Lymphadenopathy:      Cervical: No cervical adenopathy.   Skin:     General: Skin is warm and dry.      Capillary Refill: Capillary refill takes less than 2 seconds.   Neurological:      Mental Status: She is alert and oriented to person, place, and time.      Motor: No weakness.   Psychiatric:         " Mood and Affect: Mood normal.         Behavior: Behavior normal.         Assessment/Plan   Problem List Items Addressed This Visit    None  Visit Diagnoses         Codes    Nasopharyngitis acute    -  Primary J00    Relevant Medications    azithromycin (Zithromax) 250 mg tablet    Pre-op exam     Z01.818    Relevant Orders    Coagulation Screen (Completed)    Staphylococcus aureus/MRSA colonization, Culture    ECG 12 lead (Clinic Performed)    Urine Culture    POCT UA (nonautomated) manually resulted (Completed)    XR chest 2 views    Hyperlipidemia, unspecified     E78.5    Relevant Medications    rosuvastatin (Crestor) 20 mg tablet    Class 1 obesity with body mass index (BMI) of 31.0 to 31.9 in adult, unspecified obesity type, unspecified whether serious comorbidity present     E66.9, Z68.31        Pre-op: EKG, labs, and chest xray ordered today.   Hyperlipidemia: Labs from 5/3/2024 reviewed. Crestor refilled today.   Nasopharyngitis: Will treat with Zithromax. Increase rest and fluids. Follow up in 1 week with any persisting symptoms, or sooner with any additional concerns.

## 2024-05-10 ENCOUNTER — TELEPHONE (OUTPATIENT)
Dept: PRIMARY CARE | Facility: CLINIC | Age: 54
End: 2024-05-10
Payer: COMMERCIAL

## 2024-05-10 ENCOUNTER — HOSPITAL ENCOUNTER (OUTPATIENT)
Dept: PREADMISSION TESTING | Age: 54
Discharge: HOME | End: 2024-05-14

## 2024-05-10 VITALS
TEMPERATURE: 98.4 F | OXYGEN SATURATION: 99 % | RESPIRATION RATE: 12 BRPM | HEIGHT: 63 IN | HEART RATE: 108 BPM | BODY MASS INDEX: 31.25 KG/M2 | DIASTOLIC BLOOD PRESSURE: 72 MMHG | SYSTOLIC BLOOD PRESSURE: 112 MMHG | WEIGHT: 176.4 LBS

## 2024-05-10 DIAGNOSIS — N39.0 GROUP B STREPTOCOCCAL UTI: Primary | ICD-10-CM

## 2024-05-10 DIAGNOSIS — B95.1 GROUP B STREPTOCOCCAL UTI: Primary | ICD-10-CM

## 2024-05-10 LAB — BACTERIA UR CULT: ABNORMAL

## 2024-05-10 RX ORDER — AMOXICILLIN 875 MG/1
875 TABLET, FILM COATED ORAL 2 TIMES DAILY
Qty: 10 TABLET | Refills: 0 | Status: SHIPPED | OUTPATIENT
Start: 2024-05-10 | End: 2024-05-15

## 2024-05-10 RX ORDER — ROSUVASTATIN CALCIUM 20 MG/1
TABLET, COATED ORAL
COMMUNITY
Start: 2022-09-07

## 2024-05-10 RX ORDER — MULTIVIT-MIN/IRON/FOLIC ACID/K 18-600-40
CAPSULE ORAL
COMMUNITY

## 2024-05-10 RX ORDER — CALCIUM CARBONATE 500(1250)
500 TABLET ORAL DAILY
COMMUNITY

## 2024-05-10 RX ORDER — MAGNESIUM 30 MG
30 TABLET ORAL 2 TIMES DAILY
COMMUNITY

## 2024-05-10 ASSESSMENT — ENCOUNTER SYMPTOMS
SINUS PAIN: 1
DIARRHEA: 0
MYALGIAS: 0
SORE THROAT: 0
DYSPHORIC MOOD: 0
FATIGUE: 0
VOMITING: 0
EYE PAIN: 0
DYSURIA: 0
NAUSEA: 0
WHEEZING: 0
EYE REDNESS: 0
FREQUENCY: 0
ABDOMINAL PAIN: 0
SINUS PRESSURE: 1
NERVOUS/ANXIOUS: 0

## 2024-05-10 NOTE — PROGRESS NOTES
Reviewed urine culture results with patient. Will treat with amoxicillin 875 mg BID x 5 days. Increase rest and fluids.   Follow up as needed with any additional concerns.

## 2024-05-10 NOTE — TELEPHONE ENCOUNTER
PT OF LAURENT     PT SEEN YESTERDAY BY BERTA SUNSHINE OF [facility] CALLED AND ASKED IF PT NOTE CAN BE ADDENDED STATING IF THE PATIENT HAS BEEN MEDICALLY CLEARED FOR SURG NEXT WEEK.

## 2024-05-11 LAB — STAPHYLOCOCCUS SPEC CULT: NORMAL

## 2024-05-16 ENCOUNTER — ANESTHESIA EVENT (OUTPATIENT)
Dept: OPERATING ROOM | Age: 54
End: 2024-05-16
Payer: COMMERCIAL

## 2024-05-16 ENCOUNTER — HOSPITAL ENCOUNTER (OUTPATIENT)
Age: 54
Setting detail: OUTPATIENT SURGERY
Discharge: HOME | End: 2024-05-16
Attending: STUDENT IN AN ORGANIZED HEALTH CARE EDUCATION/TRAINING PROGRAM | Admitting: STUDENT IN AN ORGANIZED HEALTH CARE EDUCATION/TRAINING PROGRAM
Payer: COMMERCIAL

## 2024-05-16 ENCOUNTER — ANESTHESIA (OUTPATIENT)
Dept: OPERATING ROOM | Age: 54
End: 2024-05-16
Payer: COMMERCIAL

## 2024-05-16 VITALS
TEMPERATURE: 97 F | OXYGEN SATURATION: 99 % | HEIGHT: 63 IN | HEART RATE: 92 BPM | SYSTOLIC BLOOD PRESSURE: 135 MMHG | WEIGHT: 176 LBS | BODY MASS INDEX: 31.18 KG/M2 | RESPIRATION RATE: 18 BRPM | DIASTOLIC BLOOD PRESSURE: 82 MMHG

## 2024-05-16 DIAGNOSIS — Z98.890 S/P LEFT KNEE ARTHROSCOPY: Primary | ICD-10-CM

## 2024-05-16 PROCEDURE — 7100000000 HC PACU RECOVERY - FIRST 15 MIN: Performed by: STUDENT IN AN ORGANIZED HEALTH CARE EDUCATION/TRAINING PROGRAM

## 2024-05-16 PROCEDURE — 6370000000 HC RX 637 (ALT 250 FOR IP): Performed by: ANESTHESIOLOGY

## 2024-05-16 PROCEDURE — 3700000001 HC ADD 15 MINUTES (ANESTHESIA): Performed by: STUDENT IN AN ORGANIZED HEALTH CARE EDUCATION/TRAINING PROGRAM

## 2024-05-16 PROCEDURE — 7100000001 HC PACU RECOVERY - ADDTL 15 MIN: Performed by: STUDENT IN AN ORGANIZED HEALTH CARE EDUCATION/TRAINING PROGRAM

## 2024-05-16 PROCEDURE — 2580000003 HC RX 258: Performed by: STUDENT IN AN ORGANIZED HEALTH CARE EDUCATION/TRAINING PROGRAM

## 2024-05-16 PROCEDURE — 6360000002 HC RX W HCPCS: Performed by: STUDENT IN AN ORGANIZED HEALTH CARE EDUCATION/TRAINING PROGRAM

## 2024-05-16 PROCEDURE — 29877 ARTHRS KNEE SURG DBRDMT/SHVG: CPT | Performed by: STUDENT IN AN ORGANIZED HEALTH CARE EDUCATION/TRAINING PROGRAM

## 2024-05-16 PROCEDURE — 3700000000 HC ANESTHESIA ATTENDED CARE: Performed by: STUDENT IN AN ORGANIZED HEALTH CARE EDUCATION/TRAINING PROGRAM

## 2024-05-16 PROCEDURE — 3600000004 HC SURGERY LEVEL 4 BASE: Performed by: STUDENT IN AN ORGANIZED HEALTH CARE EDUCATION/TRAINING PROGRAM

## 2024-05-16 PROCEDURE — 7100000011 HC PHASE II RECOVERY - ADDTL 15 MIN: Performed by: STUDENT IN AN ORGANIZED HEALTH CARE EDUCATION/TRAINING PROGRAM

## 2024-05-16 PROCEDURE — 3600000014 HC SURGERY LEVEL 4 ADDTL 15MIN: Performed by: STUDENT IN AN ORGANIZED HEALTH CARE EDUCATION/TRAINING PROGRAM

## 2024-05-16 PROCEDURE — 2580000003 HC RX 258: Performed by: ANESTHESIOLOGY

## 2024-05-16 PROCEDURE — 2709999900 HC NON-CHARGEABLE SUPPLY: Performed by: STUDENT IN AN ORGANIZED HEALTH CARE EDUCATION/TRAINING PROGRAM

## 2024-05-16 PROCEDURE — 7100000010 HC PHASE II RECOVERY - FIRST 15 MIN: Performed by: STUDENT IN AN ORGANIZED HEALTH CARE EDUCATION/TRAINING PROGRAM

## 2024-05-16 PROCEDURE — 6360000002 HC RX W HCPCS: Performed by: NURSE ANESTHETIST, CERTIFIED REGISTERED

## 2024-05-16 RX ORDER — SODIUM CHLORIDE 0.9 % (FLUSH) 0.9 %
5-40 SYRINGE (ML) INJECTION PRN
Status: DISCONTINUED | OUTPATIENT
Start: 2024-05-16 | End: 2024-05-16 | Stop reason: HOSPADM

## 2024-05-16 RX ORDER — IPRATROPIUM BROMIDE AND ALBUTEROL SULFATE 2.5; .5 MG/3ML; MG/3ML
1 SOLUTION RESPIRATORY (INHALATION)
Status: DISCONTINUED | OUTPATIENT
Start: 2024-05-16 | End: 2024-05-16 | Stop reason: HOSPADM

## 2024-05-16 RX ORDER — GLUCAGON 1 MG/ML
1 KIT INJECTION PRN
Status: DISCONTINUED | OUTPATIENT
Start: 2024-05-16 | End: 2024-05-16 | Stop reason: HOSPADM

## 2024-05-16 RX ORDER — NALOXONE HYDROCHLORIDE 0.4 MG/ML
INJECTION, SOLUTION INTRAMUSCULAR; INTRAVENOUS; SUBCUTANEOUS PRN
Status: DISCONTINUED | OUTPATIENT
Start: 2024-05-16 | End: 2024-05-16 | Stop reason: HOSPADM

## 2024-05-16 RX ORDER — SODIUM CHLORIDE 0.9 % (FLUSH) 0.9 %
5-40 SYRINGE (ML) INJECTION EVERY 12 HOURS SCHEDULED
Status: DISCONTINUED | OUTPATIENT
Start: 2024-05-16 | End: 2024-05-16 | Stop reason: HOSPADM

## 2024-05-16 RX ORDER — LIDOCAINE HYDROCHLORIDE 20 MG/ML
INJECTION, SOLUTION INTRAVENOUS PRN
Status: DISCONTINUED | OUTPATIENT
Start: 2024-05-16 | End: 2024-05-16 | Stop reason: SDUPTHER

## 2024-05-16 RX ORDER — SODIUM CHLORIDE, SODIUM LACTATE, POTASSIUM CHLORIDE, CALCIUM CHLORIDE 600; 310; 30; 20 MG/100ML; MG/100ML; MG/100ML; MG/100ML
INJECTION, SOLUTION INTRAVENOUS CONTINUOUS
Status: DISCONTINUED | OUTPATIENT
Start: 2024-05-16 | End: 2024-05-16 | Stop reason: HOSPADM

## 2024-05-16 RX ORDER — ONDANSETRON 2 MG/ML
INJECTION INTRAMUSCULAR; INTRAVENOUS PRN
Status: DISCONTINUED | OUTPATIENT
Start: 2024-05-16 | End: 2024-05-16 | Stop reason: SDUPTHER

## 2024-05-16 RX ORDER — ROPIVACAINE HYDROCHLORIDE 5 MG/ML
INJECTION, SOLUTION EPIDURAL; INFILTRATION; PERINEURAL PRN
Status: DISCONTINUED | OUTPATIENT
Start: 2024-05-16 | End: 2024-05-16 | Stop reason: HOSPADM

## 2024-05-16 RX ORDER — HYDROCODONE BITARTRATE AND ACETAMINOPHEN 5; 325 MG/1; MG/1
1 TABLET ORAL EVERY 6 HOURS PRN
Qty: 20 TABLET | Refills: 0 | Status: SHIPPED | OUTPATIENT
Start: 2024-05-16 | End: 2024-05-21

## 2024-05-16 RX ORDER — OXYCODONE HYDROCHLORIDE 5 MG/1
5 TABLET ORAL PRN
Status: COMPLETED | OUTPATIENT
Start: 2024-05-16 | End: 2024-05-16

## 2024-05-16 RX ORDER — MIDAZOLAM HYDROCHLORIDE 1 MG/ML
INJECTION INTRAMUSCULAR; INTRAVENOUS PRN
Status: DISCONTINUED | OUTPATIENT
Start: 2024-05-16 | End: 2024-05-16 | Stop reason: SDUPTHER

## 2024-05-16 RX ORDER — HYDRALAZINE HYDROCHLORIDE 20 MG/ML
10 INJECTION INTRAMUSCULAR; INTRAVENOUS
Status: DISCONTINUED | OUTPATIENT
Start: 2024-05-16 | End: 2024-05-16 | Stop reason: HOSPADM

## 2024-05-16 RX ORDER — METOCLOPRAMIDE HYDROCHLORIDE 5 MG/ML
10 INJECTION INTRAMUSCULAR; INTRAVENOUS
Status: DISCONTINUED | OUTPATIENT
Start: 2024-05-16 | End: 2024-05-16 | Stop reason: HOSPADM

## 2024-05-16 RX ORDER — OXYCODONE HYDROCHLORIDE 5 MG/1
10 TABLET ORAL PRN
Status: COMPLETED | OUTPATIENT
Start: 2024-05-16 | End: 2024-05-16

## 2024-05-16 RX ORDER — MEPERIDINE HYDROCHLORIDE 25 MG/ML
12.5 INJECTION INTRAMUSCULAR; INTRAVENOUS; SUBCUTANEOUS EVERY 5 MIN PRN
Status: DISCONTINUED | OUTPATIENT
Start: 2024-05-16 | End: 2024-05-16 | Stop reason: HOSPADM

## 2024-05-16 RX ORDER — DEXAMETHASONE SODIUM PHOSPHATE 10 MG/ML
INJECTION INTRAMUSCULAR; INTRAVENOUS PRN
Status: DISCONTINUED | OUTPATIENT
Start: 2024-05-16 | End: 2024-05-16 | Stop reason: SDUPTHER

## 2024-05-16 RX ORDER — FENTANYL CITRATE 0.05 MG/ML
25 INJECTION, SOLUTION INTRAMUSCULAR; INTRAVENOUS EVERY 5 MIN PRN
Status: DISCONTINUED | OUTPATIENT
Start: 2024-05-16 | End: 2024-05-16 | Stop reason: HOSPADM

## 2024-05-16 RX ORDER — SODIUM CHLORIDE 9 MG/ML
INJECTION, SOLUTION INTRAVENOUS PRN
Status: DISCONTINUED | OUTPATIENT
Start: 2024-05-16 | End: 2024-05-16 | Stop reason: HOSPADM

## 2024-05-16 RX ORDER — PROPOFOL 10 MG/ML
INJECTION, EMULSION INTRAVENOUS PRN
Status: DISCONTINUED | OUTPATIENT
Start: 2024-05-16 | End: 2024-05-16 | Stop reason: SDUPTHER

## 2024-05-16 RX ORDER — LABETALOL HYDROCHLORIDE 5 MG/ML
10 INJECTION, SOLUTION INTRAVENOUS
Status: DISCONTINUED | OUTPATIENT
Start: 2024-05-16 | End: 2024-05-16 | Stop reason: HOSPADM

## 2024-05-16 RX ORDER — FENTANYL CITRATE 50 UG/ML
INJECTION, SOLUTION INTRAMUSCULAR; INTRAVENOUS PRN
Status: DISCONTINUED | OUTPATIENT
Start: 2024-05-16 | End: 2024-05-16 | Stop reason: SDUPTHER

## 2024-05-16 RX ORDER — MAGNESIUM HYDROXIDE 1200 MG/15ML
LIQUID ORAL CONTINUOUS PRN
Status: DISCONTINUED | OUTPATIENT
Start: 2024-05-16 | End: 2024-05-16 | Stop reason: HOSPADM

## 2024-05-16 RX ORDER — ONDANSETRON 2 MG/ML
4 INJECTION INTRAMUSCULAR; INTRAVENOUS
Status: DISCONTINUED | OUTPATIENT
Start: 2024-05-16 | End: 2024-05-16 | Stop reason: HOSPADM

## 2024-05-16 RX ORDER — LIDOCAINE HYDROCHLORIDE 10 MG/ML
1 INJECTION, SOLUTION EPIDURAL; INFILTRATION; INTRACAUDAL; PERINEURAL
Status: DISCONTINUED | OUTPATIENT
Start: 2024-05-16 | End: 2024-05-16 | Stop reason: HOSPADM

## 2024-05-16 RX ORDER — DEXTROSE MONOHYDRATE 100 MG/ML
INJECTION, SOLUTION INTRAVENOUS CONTINUOUS PRN
Status: DISCONTINUED | OUTPATIENT
Start: 2024-05-16 | End: 2024-05-16 | Stop reason: HOSPADM

## 2024-05-16 RX ADMIN — SODIUM CHLORIDE, POTASSIUM CHLORIDE, SODIUM LACTATE AND CALCIUM CHLORIDE: 600; 310; 30; 20 INJECTION, SOLUTION INTRAVENOUS at 13:20

## 2024-05-16 RX ADMIN — DEXAMETHASONE SODIUM PHOSPHATE 10 MG: 10 INJECTION INTRAMUSCULAR; INTRAVENOUS at 14:02

## 2024-05-16 RX ADMIN — FENTANYL CITRATE 25 MCG: 50 INJECTION, SOLUTION INTRAMUSCULAR; INTRAVENOUS at 14:37

## 2024-05-16 RX ADMIN — OXYCODONE 5 MG: 5 TABLET ORAL at 16:40

## 2024-05-16 RX ADMIN — FENTANYL CITRATE 25 MCG: 50 INJECTION, SOLUTION INTRAMUSCULAR; INTRAVENOUS at 14:20

## 2024-05-16 RX ADMIN — MIDAZOLAM HYDROCHLORIDE 2 MG: 1 INJECTION, SOLUTION INTRAMUSCULAR; INTRAVENOUS at 13:52

## 2024-05-16 RX ADMIN — ONDANSETRON 4 MG: 2 INJECTION INTRAMUSCULAR; INTRAVENOUS at 14:03

## 2024-05-16 RX ADMIN — PROPOFOL 200 MG: 10 INJECTION, EMULSION INTRAVENOUS at 13:56

## 2024-05-16 RX ADMIN — LIDOCAINE HYDROCHLORIDE 80 MG: 20 INJECTION, SOLUTION INTRAVENOUS at 13:56

## 2024-05-16 RX ADMIN — VANCOMYCIN HYDROCHLORIDE 1000 MG: 1 INJECTION, POWDER, LYOPHILIZED, FOR SOLUTION INTRAVENOUS at 13:51

## 2024-05-16 RX ADMIN — FENTANYL CITRATE 25 MCG: 50 INJECTION, SOLUTION INTRAMUSCULAR; INTRAVENOUS at 14:25

## 2024-05-16 RX ADMIN — FENTANYL CITRATE 25 MCG: 50 INJECTION, SOLUTION INTRAMUSCULAR; INTRAVENOUS at 14:31

## 2024-05-16 ASSESSMENT — PAIN DESCRIPTION - ORIENTATION
ORIENTATION: LEFT

## 2024-05-16 ASSESSMENT — PAIN DESCRIPTION - ONSET: ONSET: GRADUAL

## 2024-05-16 ASSESSMENT — PAIN DESCRIPTION - LOCATION
LOCATION: KNEE

## 2024-05-16 ASSESSMENT — PAIN - FUNCTIONAL ASSESSMENT: PAIN_FUNCTIONAL_ASSESSMENT: 0-10

## 2024-05-16 ASSESSMENT — PAIN SCALES - GENERAL
PAINLEVEL_OUTOF10: 5
PAINLEVEL_OUTOF10: 2
PAINLEVEL_OUTOF10: 5
PAINLEVEL_OUTOF10: 0
PAINLEVEL_OUTOF10: 0
PAINLEVEL_OUTOF10: 2
PAINLEVEL_OUTOF10: 2

## 2024-05-16 ASSESSMENT — PAIN DESCRIPTION - PAIN TYPE
TYPE: SURGICAL PAIN
TYPE: SURGICAL PAIN

## 2024-05-16 ASSESSMENT — PAIN DESCRIPTION - DESCRIPTORS
DESCRIPTORS: ACHING;THROBBING
DESCRIPTORS: ACHING
DESCRIPTORS: ACHING

## 2024-05-16 NOTE — ANESTHESIA POSTPROCEDURE EVALUATION
Department of Anesthesiology  Postprocedure Note    Patient: Ina Weinberg  MRN: [id number]  YOB: 1970  Date of evaluation: 5/16/2024    Procedure Summary       Date: 05/16/24 Room / Location: [address]    Anesthesia Start: 1352 Anesthesia Stop: 1449    Procedure: LEFT KNEE ARTHROSCOPY KNEE DIAGNOSTIC PATELLA FEMORAL CHONDROPLASTY (Left: Knee) Diagnosis:       Synovial cyst of left knee      (Synovial cyst of left knee [M71.22])    Surgeons: Maurizio Acuna MD Responsible Provider: Emery Beasley MD    Anesthesia Type: general ASA Status: 2            Anesthesia Type: No value filed.    Earnest Phase I: Earnest Score: 10    Earnest Phase II:      Anesthesia Post Evaluation    Patient location during evaluation: PACU  Patient participation: complete - patient participated  Level of consciousness: awake  Pain score: 0  Airway patency: patent  Nausea & Vomiting: no nausea and no vomiting  Cardiovascular status: hemodynamically stable  Respiratory status: acceptable  Hydration status: euvolemic  Pain management: adequate        No notable events documented.

## 2024-05-16 NOTE — OP NOTE
bulky dressing was applied.  Patient tolerated procedure well was taken to postanesthesia care unit in good condition.  No immediate complications.    Summary of Findings:  Grade 3 Outerbridge change/medial femoral condyle lesion 6 x 6 mm    Postoperative plan:  Weight-bear as tolerated.  Activity as tolerated.  Range of motion as tolerated  Follow-up 7 to 10 days.  Ice and elevate  DVT prophylaxis: Generalized ambulation, patient at low risk based on procedure type.  Patient specific discharge instructions provided in the chart  X-rays at follow-up none        Created with Dragon software, some didactic errors may be identified due to dragon software this note has not been proofread.                        Electronically signed by Maurizio Acuna MD on 5/16/2024 at 2:37 PM

## 2024-05-16 NOTE — ANESTHESIA PRE PROCEDURE
Department of Anesthesiology  Preprocedure Note       Name:  Ina Weinberg   Age:  53 y.o.  :  1970                                          MRN:  [id number]         Date:  2024      Surgeon: Surgeon(s):  Maurizio Acuna MD    Procedure: Procedure(s):  LEFT KNEE ARTHROSCOPY KNEE DIAGNOSTIC, SUPINE    Medications prior to admission:   Prior to Admission medications    Medication Sig Start Date End Date Taking? Authorizing Provider   rosuvastatin (CRESTOR) 20 MG tablet Take by mouth 22   Bo Salazar MD   magnesium 30 MG tablet Take 1 tablet by mouth 2 times daily    Bo Salazar MD   Elderberry 500 MG CAPS Take by mouth    Bo Salazar MD   calcium carbonate (OSCAL) 500 MG TABS tablet Take 1 tablet by mouth daily    Bo Salazar MD   Cholecalciferol (VITAMIN D) 50 MCG (2000) CAPS capsule Take by mouth    Bo Salazar MD   B Complex Vitamins (VITAMIN B COMPLEX) TABS Take by mouth    Bo Salazar MD   Multiple Vitamins-Minerals (MULTIVITAL PO) Take 1 tablet by mouth daily    Bo Salazar MD       Current medications:    Current Facility-Administered Medications   Medication Dose Route Frequency Provider Last Rate Last Admin   • lactated ringers IV soln infusion   IntraVENous Continuous Emery Beasley  mL/hr at 24 1335 NoRateChange at 24 1335   • lidocaine PF 1 % injection 1 mL  1 mL IntraDERmal Once PRN Emery Beasley MD       • sodium chloride flush 0.9 % injection 5-40 mL  5-40 mL IntraVENous 2 times per day Emery Beasley MD       • sodium chloride flush 0.9 % injection 5-40 mL  5-40 mL IntraVENous PRN Emery Beasley MD       • 0.9 % sodium chloride infusion   IntraVENous PRN Emery Beasley MD       • vancomycin 1000 mg IVPB in 250 mL NS addavial  1,000 mg IntraVENous Once Maurizio Acuna MD           Allergies:    Allergies   Allergen Reactions   • Sulfa Antibiotics Shortness Of Breath and Swelling

## 2024-05-16 NOTE — DISCHARGE INSTRUCTIONS
POST-OPERATIVE INSTRUCTIONS: KNEE ARTHROSCOPY  Dr. Maurizio Acuna III, MD    ACTIVITY  ·Crutches may help you balance for the first few days; however, you may put as much weight as comfortable on your leg.    ·You may bend and straighten your knee as much as you like.  ·Do not engage in prolonged periods of standing or walking over the first 7-10 days following surgery.  ·Avoid long periods of sitting (without leg elevated) or long distance traveling for 2 weeks.    DRESSINGS & INCISIONS  ·The first two days after surgery you can expect a small amount of red-tinged drainage on your dressings.  This is normal.   ·Please keep the dressing clean and dry; if you are going to shower/bathe, you must protect the dressing.  You may not soak in a pool, lake, hot tub, or the ocean until 1 week after the sutures have been removed.  ·You may remove the dressing 4 days after surgery (white cotton wrap, white gauze pads, yellow gauze tape).   ·You may apply Band AidsÒ to the incisions or leave them open to air.  ·Please do not use BacitracinÒ or other ointments on the incisions.    PAIN & INFLAMMATION  ·Ice - Apply an ice bag wrapped in a dry towel several times per day for 20 minutes for the first week and then as needed for pain relief and inflammation.   ·Compression - Use your ACE wrap to decrease swelling. Always wrap from your foot towards your thigh.  ·Elevation - Keep your foot elevated above your heart as much as possible for the first 3 to 4 days.  Keep your leg elevated with a pillow under your calf or foot, NOT under the knee.  ·Pain Medication  You have been given a prescription for pain control; please take as directed.  If you think you will require a refill on your medication, you MUST do so during our regular weekday office hours.  If you need additional pain medication you may take Tylenol 500-650mg every 4-6 hours. Do not take more than 3grams or 3000mg in a 24 hour period!  Common side effects of the pain

## 2024-05-31 ENCOUNTER — OFFICE VISIT (OUTPATIENT)
Dept: ORTHOPEDIC SURGERY | Facility: CLINIC | Age: 54
End: 2024-05-31
Payer: COMMERCIAL

## 2024-05-31 DIAGNOSIS — Z98.890 STATUS POST ARTHROSCOPY OF LEFT KNEE: Primary | ICD-10-CM

## 2024-05-31 PROCEDURE — 99024 POSTOP FOLLOW-UP VISIT: CPT | Performed by: STUDENT IN AN ORGANIZED HEALTH CARE EDUCATION/TRAINING PROGRAM

## 2024-05-31 PROCEDURE — 3008F BODY MASS INDEX DOCD: CPT | Performed by: STUDENT IN AN ORGANIZED HEALTH CARE EDUCATION/TRAINING PROGRAM

## 2024-05-31 PROCEDURE — 1036F TOBACCO NON-USER: CPT | Performed by: STUDENT IN AN ORGANIZED HEALTH CARE EDUCATION/TRAINING PROGRAM

## 2024-05-31 NOTE — PROGRESS NOTES
Chief Complaint   Patient presents with    Left Knee - Post-op     S/p arthroscopy diagnostic patella femoral chondroplasty on 24 - 15 days out          HPI  Louise is a 53-year-old female presenting today for follow-up left knee arthroscopy.  Overall states she is doing very well following and pain is improving.  Endorses minimal swelling.  Starting to be more active with minimal pain.  Denies any mechanical symptoms of popping and clicking.  Continues to deny any numbness tingling to the lower extremity.     Past Medical History:   Diagnosis Date    Encounter for screening for malignant neoplasm of colon 2019    Screening for colon cancer    Personal history of other diseases of the respiratory system 2014    History of acute bronchitis    Personal history of other diseases of the respiratory system 2020    History of sore throat    Personal history of other diseases of the respiratory system 2020    History of acute pharyngitis    Personal history of other drug therapy 2019    History of influenza vaccination    Personal history of other specified conditions 2014    History of vomiting       Medication Documentation Review Audit       Reviewed by Renee Allen MA (Medical Assistant) on 24 at 1256      Medication Order Taking? Sig Documenting Provider Last Dose Status   ascorbic acid (Vitamin C) 500 mg tablet [id number] No Take 1 tablet (500 mg) by mouth once daily. Historical Provider, MD Taking Active   butalbital-acetaminophen-caff -40 mg tablet [id number] No Take 1 tablet by mouth every 6 hours if needed. Historical Provider, MD Taking Active   rosuvastatin (Crestor) 20 mg tablet [id number]  Take 1 tablet (20 mg) by mouth once daily. Gregoria Lpoez, APRN-CNP  Active   semaglutide, weight loss, (Wegovy) 0.25 mg/0.5 mL pen injector [id number] No Inject 0.25 mg under the skin 1 (one) time per week for 4 doses. Javon Barragan MD Taking  24 5965    traZODone (Desyrel) 50 mg tablet [id number]  Take 1 tablet (50 mg) by mouth once daily at bedtime. Bobby Mcgowan DO  Active                    Allergies   Allergen Reactions    Ibuprofen Anaphylaxis     Throat closed up, face tingling, LOC X 15 secs    Alcohol Unknown    Aspirin Unknown    Celecoxib Unknown    Cyclobenzaprine Unknown    Diclofenac Sodium Itching    Levofloxacin Unknown    Naproxen Sodium Other    Nsaids (Non-Steroidal Anti-Inflammatory Drug) Unknown    Quinolones Itching     nauseated    Sulfa (Sulfonamide Antibiotics) Other    Sumatriptan Unknown    Cefdinir Rash       Social History     Socioeconomic History    Marital status:      Spouse name: Not on file    Number of children: Not on file    Years of education: Not on file    Highest education level: Not on file   Occupational History    Not on file   Tobacco Use    Smoking status: Former     Types: Cigarettes    Smokeless tobacco: Never   Vaping Use    Vaping status: Never Used   Substance and Sexual Activity    Alcohol use: Never    Drug use: Never    Sexual activity: Defer   Other Topics Concern    Not on file   Social History Narrative    Not on file     Social Determinants of Health     Financial Resource Strain: Low Risk  (1/28/2024)    Received from [facility]    Overall Financial Resource Strain (CARDIA)     Difficulty of Paying Living Expenses: Not hard at all   Food Insecurity: No Food Insecurity (5/24/2024)    Received from [facility]    Hunger Vital Sign     Worried About Running Out of Food in the Last Year: Never true     Ran Out of Food in the Last Year: Never true   Transportation Needs: No Transportation Needs (5/24/2024)    Received from [facility]    PRAPARE - Transportation     Lack of Transportation (Medical): No     Lack of Transportation (Non-Medical): No   Physical Activity: Not on file   Stress: Not on file   Social Connections: Not on file   Intimate Partner Violence: Not on file   Housing  Stability: Unknown (5/24/2024)    Received from [facility]    Housing Stability Vital Sign     Unable to Pay for Housing in the Last Year: No     Number of Places Lived in the Last Year: Not on file     Unstable Housing in the Last Year: No       Past Surgical History:   Procedure Laterality Date    OTHER SURGICAL HISTORY  08/30/2022    Breast reduction            Review of Systems   GENERAL: Negative for malaise, significant weight loss, fever  MUSCULOSKELETAL: see HPI  NEURO:  Negative    There is no height or weight on file to calculate BMI.      Exam  Left knee:  Skin healthy and intact  No gross swelling or ecchymosis  Alignment: Neutral  Effusion: Mild if any  ROM: 0-1 20  No crepitance with range of motion appreciated  No pain with internal rotation of the hip  Tenderness to palpation: Mild at incision sites     No laxity to valgus stress  No laxity to varus stress  Negative Lachman´s test  Negative posterior drawer test  Mild pain with Cristela´s test     Neurovascular exam normal distally  2+ DP pulse and good cap refill       Imaging  XR chest 2 views  Narrative: Interpreted By:  Kyle Garcia,   STUDY:  XR CHEST 2 VIEWS;  5/9/2024 9:57 am      INDICATION:  Signs/Symptoms:pre op.      COMPARISON:  Chest x-ray 01/15/2020.      ACCESSION NUMBER(S):  [id number]      ORDERING CLINICIAN:  ARMEN AVELAR      FINDINGS:  PA and lateral radiographs of the chest.      CARDIOMEDIASTINAL SILHOUETTE:  Cardiomediastinal silhouette is normal in size and configuration.      LUNGS:  No pleural effusion, focal consolidation, or pneumothorax.      ABDOMEN:  No remarkable upper abdominal findings.      BONES:  No acute osseous changes.      Impression: 1.  No evidence of acute cardiopulmonary process.      Signed by: Kyle Garcia 5/12/2024 2:41 PM  Dictation workstation:   [id number]      Procedures    Assessment  53-year-old female status post left knee arthroscopy, 2 weeks out     Plan  Arthroscopic  images reviewed today  We reviewed an evidence-based approach to OA of the knee.  We discussed past treatments as well options for future treatment.  Discussed importance of low impact aerobic exercises  Discussed importance of nutritious diet and weight loss to help offload the knee joint  Patient elects to proceed with conservative treatment at this time.  Will continue to monitor for symptoms.  No intervention is needed at this time.  Patient will follow-up on an as-needed basis    Familia Miranda PA-C

## 2024-06-03 ENCOUNTER — APPOINTMENT (OUTPATIENT)
Dept: PRIMARY CARE | Facility: CLINIC | Age: 54
End: 2024-06-03
Payer: COMMERCIAL

## 2024-06-05 DIAGNOSIS — Z12.12 ENCOUNTER FOR COLORECTAL CANCER SCREENING: ICD-10-CM

## 2024-06-05 DIAGNOSIS — Z12.11 ENCOUNTER FOR COLORECTAL CANCER SCREENING: ICD-10-CM

## 2024-06-14 ENCOUNTER — APPOINTMENT (OUTPATIENT)
Dept: PHARMACY | Facility: HOSPITAL | Age: 54
End: 2024-06-14
Payer: COMMERCIAL

## 2024-06-20 ENCOUNTER — APPOINTMENT (OUTPATIENT)
Dept: PHARMACY | Facility: HOSPITAL | Age: 54
End: 2024-06-20
Payer: COMMERCIAL

## 2024-06-20 DIAGNOSIS — R63.5 WEIGHT GAIN: ICD-10-CM

## 2024-06-20 RX ORDER — LANOLIN ALCOHOL/MO/W.PET/CERES
1000 CREAM (GRAM) TOPICAL DAILY
COMMUNITY

## 2024-06-20 RX ORDER — MAGNESIUM OXIDE 420 MG/1
1 TABLET ORAL DAILY
COMMUNITY

## 2024-06-20 RX ORDER — CYANOCOBALAMIN (VITAMIN B-12) 500 MCG
400 TABLET ORAL DAILY
COMMUNITY

## 2024-06-20 RX ORDER — BISMUTH SUBSALICYLATE 262 MG
1 TABLET,CHEWABLE ORAL DAILY
COMMUNITY

## 2024-06-20 NOTE — ASSESSMENT & PLAN NOTE
Patient was started on Wegovy about 3 weeks ago. Patient states since starting Wegovy she does not feel herself, sits on the couch all day, is mean, and has not eaten much in 3 weeks.    DISCONTINUE  Wegovy  0.25mg weekly    Discussed with patient the option of changing to Zepbound (would require PA) but patient would like to come off all weight loss medications.

## 2024-06-20 NOTE — PROGRESS NOTES
Patient ID: Louise Sanford is a 53 y.o. female who presents for Weight Management.    Referring Provider: Javon Barragan MD  PCP: Bobby Mcgowan DO Last visit with PCP: 5/1/2024; Next visit with PCP: 1970      Subjective   Allergies   Allergen Reactions    Ibuprofen Anaphylaxis     Throat closed up, face tingling, LOC X 15 secs    Alcohol Unknown    Aspirin Unknown    Celecoxib Unknown    Cyclobenzaprine Unknown    Diclofenac Sodium Itching    Levofloxacin Unknown    Naproxen Sodium Other    Nsaids (Non-Steroidal Anti-Inflammatory Drug) Unknown    Quinolones Itching     nauseated    Sulfa (Sulfonamide Antibiotics) Other    Sumatriptan Unknown    Cefdinir Rash     Social History     Social History Narrative    Not on file      Medication Review  Current Outpatient Medications   Medication Instructions    ascorbic acid (Vitamin C) 500 mg tablet 1 tablet, oral, Daily    ascorbic acid/collagen hydr (COLLAGEN PLUS VITAMIN C ORAL) 1 tablet, oral, Daily    cholecalciferol (VITAMIN D3) 400 Units, oral, Daily    cyanocobalamin (VITAMIN B-12) 1,000 mcg, oral, Daily    ELDERBERRY FRUIT ORAL 1 tablet, oral, Daily    magnesium oxide (Mag-Ox) 420 mg tablet 1 tablet, oral, Daily    multivitamin tablet 1 tablet, oral, Daily    rosuvastatin (CRESTOR) 20 mg, oral, Daily    Wegovy 0.25 mg, subcutaneous, Once Weekly        Medication Reconciliation:  Added: Collagen, Vitamin D3, Vitamin B12, Elderberry, Magnesium, MV  Discontinued: Butalbital/Acetaminophen    Medication System Management  Patients preferred pharmacy: [facility]  Affordability/Accessibility: No issues regarding cost, wegovy $0  Adherence/Organization: Patient self stopped all medications after surgery when she was on a liquid diet    Discussed filling medications with [facility]  Ease of communication between providers and pharmacy  Quicker handling of insurance troubleshooting  Ability to autorefill medications  Delivery options to  "home    Drug Interactions  No significant drug interactions were noted.    HPI  Louise Sanford is a 53 year old female who presents to the Clinical Pharmacy team for weight loss management. Patient was started on Wegovy 0.25mg weekly and has taken 3 doses. She states she feels horrible on the medication. She has been mean to her family, not eaten much in 3 weeks, and has no energy to do anything. Patient states she was on the medication about a year ago for one month but did not have these side effects. At that time, she was also on 4 medications for mood disorder, and has since stopped all medications besides Crestor.      Objective   Vitals  BP Readings from Last 2 Encounters:   05/09/24 120/78   05/01/24 104/78     BMI Readings from Last 1 Encounters:   05/09/24 31.53 kg/m²      Labs  A1C  Lab Results   Component Value Date    HGBA1C 6.1 (H) 05/03/2024    HGBA1C 6.3 (H) 01/11/2024    HGBA1C 6.2 (A) 05/15/2023     BMP  Lab Results   Component Value Date    CALCIUM 8.8 05/03/2024     05/03/2024    K 4.6 05/03/2024    CO2 28 05/03/2024     05/03/2024    BUN 14 05/03/2024    CREATININE 0.92 05/03/2024    EGFR 74 05/03/2024     LFTs  Lab Results   Component Value Date    ALT 19 05/03/2024    AST 16 05/03/2024    ALKPHOS 89 05/03/2024    BILITOT 0.4 05/03/2024     FLP  Lab Results   Component Value Date    TRIG 319 (H) 05/03/2024    CHOL 282 (H) 05/03/2024    LDLF 66 06/20/2023    LDLCALC 176 (H) 05/03/2024    HDL 41.8 05/03/2024     Urine Microalbumin  No results found for: \"MICROALBCREA\"  Weight Management  Wt Readings from Last 3 Encounters:   05/09/24 80.7 kg (178 lb)   05/01/24 81.2 kg (179 lb)   01/19/24 79.4 kg (175 lb)      There is no height or weight on file to calculate BMI.     Assessment/Plan   Problem List Items Addressed This Visit       Weight gain     Patient was started on Wegovy about 3 weeks ago. Patient states since starting Wegovy she does not feel herself, sits on the couch all day, " is mean, and has not eaten much in 3 weeks.    DISCONTINUE  Wegovy  0.25mg weekly    Discussed with patient the option of changing to Zepbound (would require PA) but patient would like to come off all weight loss medications.             Follow up with Clinical Pharmacy Team as needed by the patient or PCP. Provided patient with [facility] phone number as needed.    Continue all meds under the continuation of care with the referring provider and clinical pharmacy team.    Please reach out to the Clinical Pharmacy Team if there are any further questions.     Verbal consent to manage patient's drug therapy was obtained from patient. They were informed they may decline to participate or withdraw from participation in pharmacy services at any time.    Marilu Altman, PharmD  [phone]

## 2024-07-22 ENCOUNTER — OFFICE VISIT (OUTPATIENT)
Dept: PRIMARY CARE | Facility: CLINIC | Age: 54
End: 2024-07-22
Payer: COMMERCIAL

## 2024-07-22 VITALS
RESPIRATION RATE: 16 BRPM | WEIGHT: 169 LBS | HEART RATE: 90 BPM | TEMPERATURE: 98.3 F | HEIGHT: 63 IN | OXYGEN SATURATION: 98 % | SYSTOLIC BLOOD PRESSURE: 100 MMHG | BODY MASS INDEX: 29.95 KG/M2 | DIASTOLIC BLOOD PRESSURE: 80 MMHG

## 2024-07-22 DIAGNOSIS — F41.8 DEPRESSION WITH ANXIETY: ICD-10-CM

## 2024-07-22 DIAGNOSIS — G89.29 CHRONIC PAIN OF LEFT KNEE: Primary | ICD-10-CM

## 2024-07-22 DIAGNOSIS — M25.562 CHRONIC PAIN OF LEFT KNEE: Primary | ICD-10-CM

## 2024-07-22 DIAGNOSIS — E66.3 OVERWEIGHT WITH BODY MASS INDEX (BMI) OF 29 TO 29.9 IN ADULT: ICD-10-CM

## 2024-07-22 PROCEDURE — 99213 OFFICE O/P EST LOW 20 MIN: CPT | Performed by: NURSE PRACTITIONER

## 2024-07-22 RX ORDER — FLUOXETINE 10 MG/1
10 CAPSULE ORAL DAILY
Qty: 30 CAPSULE | Refills: 1 | Status: SHIPPED | OUTPATIENT
Start: 2024-07-22 | End: 2024-09-20

## 2024-07-22 ASSESSMENT — PATIENT HEALTH QUESTIONNAIRE - PHQ9
9. THOUGHTS THAT YOU WOULD BE BETTER OFF DEAD, OR OF HURTING YOURSELF: NEARLY EVERY DAY
SUM OF ALL RESPONSES TO PHQ9 QUESTIONS 1 AND 2: 6
8. MOVING OR SPEAKING SO SLOWLY THAT OTHER PEOPLE COULD HAVE NOTICED. OR THE OPPOSITE, BEING SO FIGETY OR RESTLESS THAT YOU HAVE BEEN MOVING AROUND A LOT MORE THAN USUAL: NOT AT ALL
10. IF YOU CHECKED OFF ANY PROBLEMS, HOW DIFFICULT HAVE THESE PROBLEMS MADE IT FOR YOU TO DO YOUR WORK, TAKE CARE OF THINGS AT HOME, OR GET ALONG WITH OTHER PEOPLE: EXTREMELY DIFFICULT
3. TROUBLE FALLING OR STAYING ASLEEP OR SLEEPING TOO MUCH: NEARLY EVERY DAY
4. FEELING TIRED OR HAVING LITTLE ENERGY: NEARLY EVERY DAY
SUM OF ALL RESPONSES TO PHQ QUESTIONS 1-9: 24
2. FEELING DOWN, DEPRESSED OR HOPELESS: NEARLY EVERY DAY
7. TROUBLE CONCENTRATING ON THINGS, SUCH AS READING THE NEWSPAPER OR WATCHING TELEVISION: NEARLY EVERY DAY
1. LITTLE INTEREST OR PLEASURE IN DOING THINGS: NEARLY EVERY DAY
6. FEELING BAD ABOUT YOURSELF - OR THAT YOU ARE A FAILURE OR HAVE LET YOURSELF OR YOUR FAMILY DOWN: NEARLY EVERY DAY
5. POOR APPETITE OR OVEREATING: NEARLY EVERY DAY

## 2024-07-22 ASSESSMENT — ENCOUNTER SYMPTOMS
IRRITABILITY: 1
FEELINGS OF WORTHLESSNESS: 1
INABILITY TO BEAR WEIGHT: 1
SLEEP DISTURBANCE: 1
SLEEP QUALITY: POOR
HOPELESSNESS: 1
THOUGHTS THAT DEATH WOULD BE EASIER: 1
NERVOUS/ANXIOUS: 1
DEPRESSION: 1
DYSPHORIC MOOD: 1
DEPRESSED MOOD: 1
DECREASED CONCENTRATION: 1

## 2024-07-22 NOTE — PROGRESS NOTES
"Subjective   Patient ID: Louise Sanford is a 53 y.o. female who presents for Depression and Knee Pain.    Depression  Visit Type: follow-up  Patient presents with the following symptoms: decreased concentration, depressed mood, excessive worry, feelings of hopelessness, feelings of worthlessness, irritability, nervousness/anxiety and thoughts of death.  Patient is not experiencing: suicidal ideas.  Frequency of symptoms: constantly   Severity: interfering with daily activities   Sleep quality: poor    Knee Pain   The pain is present in the left knee and right knee. The pain is at a severity of 9/10. The pain is severe. The pain has been Constant since onset. Associated symptoms include an inability to bear weight. The symptoms are aggravated by movement. She has tried elevation and rest for the symptoms. The treatment provided mild relief.   53 year old female presents today complaining of depression. She states that she stopped all of her depression medications. She had left knee surgery on May 16th 2024. She states she feels worse than before the surgery. She is in pain and doesn't want to go out and do things. She states that she has little motivation to do things she once enjoyed. She has tried counseling tried 2 different times with little relief. She denies any SI/HI.     Review of Systems   Constitutional:  Positive for irritability.   Psychiatric/Behavioral:  Positive for decreased concentration, depression, dysphoric mood and sleep disturbance. Negative for suicidal ideas. The patient is nervous/anxious.        Objective   /80 (BP Location: Left arm, Patient Position: Sitting, BP Cuff Size: Adult)   Pulse 90   Temp 36.8 °C (98.3 °F) (Temporal)   Resp 16   Ht 1.6 m (5' 3\")   Wt 76.7 kg (169 lb)   SpO2 98%   BMI 29.94 kg/m²     Physical Exam  Vitals and nursing note reviewed.   Cardiovascular:      Rate and Rhythm: Normal rate and regular rhythm.      Heart sounds: Normal heart sounds. "   Pulmonary:      Effort: Pulmonary effort is normal.      Breath sounds: Normal breath sounds.   Psychiatric:         Mood and Affect: Mood is depressed. Affect is tearful.       Assessment/Plan   Problem List Items Addressed This Visit    None  Visit Diagnoses         Codes    Chronic pain of left knee    -  Primary M25.562, G89.29    Relevant Orders    Referral to Pain Medicine    Depression with anxiety     F41.8    Relevant Medications    FLUoxetine (PROzac) 10 mg capsule    Overweight with body mass index (BMI) of 29 to 29.9 in adult     E66.3, Z68.29        Left knee pain: will refer to pain mgmt  Depression/Anxiety: Start fluoxetine. Educated on medication and possible side effects.   Follow up in 1 month for recheck, or sooner with any additional concerns.

## 2024-08-21 ENCOUNTER — OFFICE VISIT (OUTPATIENT)
Dept: PRIMARY CARE | Facility: CLINIC | Age: 54
End: 2024-08-21
Payer: COMMERCIAL

## 2024-08-21 VITALS
BODY MASS INDEX: 29.59 KG/M2 | HEART RATE: 100 BPM | TEMPERATURE: 98 F | HEIGHT: 63 IN | OXYGEN SATURATION: 98 % | WEIGHT: 167 LBS | RESPIRATION RATE: 16 BRPM | DIASTOLIC BLOOD PRESSURE: 70 MMHG | SYSTOLIC BLOOD PRESSURE: 100 MMHG

## 2024-08-21 DIAGNOSIS — E66.3 OVERWEIGHT WITH BODY MASS INDEX (BMI) OF 29 TO 29.9 IN ADULT: ICD-10-CM

## 2024-08-21 DIAGNOSIS — M54.32 SCIATICA OF LEFT SIDE: Primary | ICD-10-CM

## 2024-08-21 DIAGNOSIS — F41.8 DEPRESSION WITH ANXIETY: ICD-10-CM

## 2024-08-21 PROCEDURE — 99214 OFFICE O/P EST MOD 30 MIN: CPT | Performed by: NURSE PRACTITIONER

## 2024-08-21 RX ORDER — FLUOXETINE HYDROCHLORIDE 20 MG/1
20 CAPSULE ORAL DAILY
Qty: 30 CAPSULE | Refills: 0 | Status: SHIPPED | OUTPATIENT
Start: 2024-08-21 | End: 2024-09-20

## 2024-08-21 RX ORDER — PREDNISONE 10 MG/1
TABLET ORAL
Qty: 30 TABLET | Refills: 0 | Status: SHIPPED | OUTPATIENT
Start: 2024-08-21 | End: 2024-08-31

## 2024-08-21 ASSESSMENT — ENCOUNTER SYMPTOMS
FEVER: 0
SHORTNESS OF BREATH: 0
DECREASED CONCENTRATION: 1
DEPRESSION: 1
PALPITATIONS: 0
DYSPHORIC MOOD: 1
WEAKNESS: 0
HEMATURIA: 0
DYSURIA: 0
ABDOMINAL PAIN: 0
NERVOUS/ANXIOUS: 1
CHILLS: 0
FATIGUE: 1
COUGH: 0
NUMBNESS: 0
BACK PAIN: 1
SLEEP DISTURBANCE: 1

## 2024-08-21 NOTE — PROGRESS NOTES
Subjective   Patient ID: Louise Sanford is a 53 y.o. female who presents for Medication Problem, Depression, and Anxiety.    Patient is being seen today because she states the medication prescribed to her for anxiety and depression isn't working for her. She says she feels like it worked for the first week but is no longer working for her. Patient denies any other concerns during today's visit.      DepressionPatient presents with the following symptoms: decreased concentration and nervousness/anxiety.  Patient is not experiencing: palpitations, shortness of breath and suicidal ideas.      Anxiety  Symptoms include decreased concentration and nervous/anxious behavior. Patient reports no chest pain, palpitations, shortness of breath or suicidal ideas.       53 year old female presents today for recheck of her depression. She states that she got only a mild relief from starting fluoxetine. She did feel some slight improvement right after starting, but states that it did not last. She states that she still has little motivation to do things she once enjoyed. She has tried counseling tried 2 different times with little relief. She states that her appetite is down. She denies any SI/HI.     She is also complaining of a flare up of sciatica. She states that she was at a car wash  with her son. She sat on the ground for too long and believes that it what flared it up. The pain is radiating down her left leg.     Review of Systems   Constitutional:  Positive for fatigue. Negative for chills and fever.   Respiratory:  Negative for cough and shortness of breath.    Cardiovascular:  Negative for chest pain and palpitations.   Gastrointestinal:  Negative for abdominal pain.   Genitourinary:  Negative for dysuria and hematuria.   Musculoskeletal:  Positive for back pain.   Neurological:  Negative for weakness and numbness.   Psychiatric/Behavioral:  Positive for decreased concentration, depression, dysphoric mood and  "sleep disturbance. Negative for suicidal ideas. The patient is nervous/anxious.        Objective   /70 (BP Location: Right arm, Patient Position: Sitting, BP Cuff Size: Adult)   Pulse 100   Temp 36.7 °C (98 °F) (Temporal)   Resp 16   Ht 1.6 m (5' 3\")   Wt 75.8 kg (167 lb)   SpO2 98%   BMI 29.58 kg/m²     Physical Exam  Constitutional:       Appearance: Normal appearance.   Cardiovascular:      Rate and Rhythm: Normal rate and regular rhythm.      Heart sounds: Normal heart sounds.   Pulmonary:      Effort: Pulmonary effort is normal.      Breath sounds: Normal breath sounds.   Abdominal:      General: Bowel sounds are normal.      Palpations: Abdomen is soft.      Tenderness: There is no abdominal tenderness.   Musculoskeletal:      Right lower leg: No edema.      Left lower leg: No edema.      Comments: Lumbar Spine: No tenderness to palpation directly over lumbar spine  Positive bilateral paraspinous muscle tenderness  ROM not tested due to pain  Lower extremity strength wnl.   Straight leg raise POSITIVE bilaterally.   Skin:     General: Skin is warm and dry.   Neurological:      Mental Status: She is alert.      Motor: No weakness.      Gait: Gait normal.      Deep Tendon Reflexes: Reflexes normal.   Psychiatric:         Mood and Affect: Mood normal.         Behavior: Behavior normal.         Assessment/Plan   Problem List Items Addressed This Visit    None  Visit Diagnoses         Codes    Sciatica of left side    -  Primary M54.32    Relevant Medications    predniSONE (Deltasone) 10 mg tablet    Depression with anxiety     F41.8    Relevant Medications    FLUoxetine (PROzac) 20 mg capsule    Overweight with body mass index (BMI) of 29 to 29.9 in adult     E66.3, Z68.29        Depression: Slight improvement. Will increase fluoxetine to 20 mg daily. Refer to behavior health for counseling. Follow up in 1 month for recheck, or sooner with any additional concerns.   Sciatica: Start short burst of " prednisone. Follow up with any persisting symptoms. If new or worsening symptoms, to ER.

## 2024-08-22 ENCOUNTER — APPOINTMENT (OUTPATIENT)
Dept: PRIMARY CARE | Facility: CLINIC | Age: 54
End: 2024-08-22
Payer: COMMERCIAL

## 2024-08-22 DIAGNOSIS — F41.8 DEPRESSION WITH ANXIETY: ICD-10-CM

## 2024-09-06 ENCOUNTER — TELEPHONE (OUTPATIENT)
Dept: PRIMARY CARE | Facility: CLINIC | Age: 54
End: 2024-09-06
Payer: COMMERCIAL

## 2024-09-06 NOTE — PROGRESS NOTES
Writer outreached pt regarding their referral to Collaborative Care. Explained program and answered pt's questions. Pt requested to schedule future initial assessment. Pt is scheduled for 9/12/2024 @ 10am in person.

## 2024-09-12 ENCOUNTER — SOCIAL WORK (OUTPATIENT)
Dept: PRIMARY CARE | Facility: CLINIC | Age: 54
End: 2024-09-12

## 2024-09-12 ENCOUNTER — DOCUMENTATION (OUTPATIENT)
Dept: BEHAVIORAL HEALTH | Facility: CLINIC | Age: 54
End: 2024-09-12

## 2024-09-12 ENCOUNTER — APPOINTMENT (OUTPATIENT)
Dept: PRIMARY CARE | Facility: CLINIC | Age: 54
End: 2024-09-12
Payer: COMMERCIAL

## 2024-09-12 ASSESSMENT — PATIENT HEALTH QUESTIONNAIRE - PHQ9
SUM OF ALL RESPONSES TO PHQ9 QUESTIONS 1 & 2: 6
8. MOVING OR SPEAKING SO SLOWLY THAT OTHER PEOPLE COULD HAVE NOTICED. OR THE OPPOSITE, BEING SO FIGETY OR RESTLESS THAT YOU HAVE BEEN MOVING AROUND A LOT MORE THAN USUAL: NOT AT ALL
5. POOR APPETITE OR OVEREATING: NEARLY EVERY DAY
10. IF YOU CHECKED OFF ANY PROBLEMS, HOW DIFFICULT HAVE THESE PROBLEMS MADE IT FOR YOU TO DO YOUR WORK, TAKE CARE OF THINGS AT HOME, OR GET ALONG WITH OTHER PEOPLE: VERY DIFFICULT
6. FEELING BAD ABOUT YOURSELF - OR THAT YOU ARE A FAILURE OR HAVE LET YOURSELF OR YOUR FAMILY DOWN: NEARLY EVERY DAY
4. FEELING TIRED OR HAVING LITTLE ENERGY: NEARLY EVERY DAY
1. LITTLE INTEREST OR PLEASURE IN DOING THINGS: NEARLY EVERY DAY
2. FEELING DOWN, DEPRESSED OR HOPELESS: NEARLY EVERY DAY
SUM OF ALL RESPONSES TO PHQ QUESTIONS 1-9: 24
9. THOUGHTS THAT YOU WOULD BE BETTER OFF DEAD, OR OF HURTING YOURSELF: NEARLY EVERY DAY
3. TROUBLE FALLING OR STAYING ASLEEP: NEARLY EVERY DAY
7. TROUBLE CONCENTRATING ON THINGS, SUCH AS READING THE NEWSPAPER OR WATCHING TELEVISION: NEARLY EVERY DAY

## 2024-09-12 ASSESSMENT — ANXIETY QUESTIONNAIRES
5. BEING SO RESTLESS THAT IT IS HARD TO SIT STILL: NEARLY EVERY DAY
6. BECOMING EASILY ANNOYED OR IRRITABLE: NEARLY EVERY DAY
3. WORRYING TOO MUCH ABOUT DIFFERENT THINGS: NEARLY EVERY DAY
2. NOT BEING ABLE TO STOP OR CONTROL WORRYING: NEARLY EVERY DAY
1. FEELING NERVOUS, ANXIOUS, OR ON EDGE: NEARLY EVERY DAY
7. FEELING AFRAID AS IF SOMETHING AWFUL MIGHT HAPPEN: SEVERAL DAYS
IF YOU CHECKED OFF ANY PROBLEMS ON THIS QUESTIONNAIRE, HOW DIFFICULT HAVE THESE PROBLEMS MADE IT FOR YOU TO DO YOUR WORK, TAKE CARE OF THINGS AT HOME, OR GET ALONG WITH OTHER PEOPLE: VERY DIFFICULT
4. TROUBLE RELAXING: NEARLY EVERY DAY
GAD7 TOTAL SCORE: 19
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
## 2024-09-17 ENCOUNTER — APPOINTMENT (OUTPATIENT)
Dept: PRIMARY CARE | Facility: CLINIC | Age: 54
End: 2024-09-17
Payer: COMMERCIAL

## 2024-09-17 ENCOUNTER — SOCIAL WORK (OUTPATIENT)
Dept: PRIMARY CARE | Facility: CLINIC | Age: 54
End: 2024-09-17
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
## 2024-09-20 ENCOUNTER — TELEPHONE (OUTPATIENT)
Dept: PRIMARY CARE | Facility: CLINIC | Age: 54
End: 2024-09-20

## 2024-09-20 DIAGNOSIS — F41.8 DEPRESSION WITH ANXIETY: ICD-10-CM

## 2024-09-20 RX ORDER — FLUOXETINE HYDROCHLORIDE 20 MG/1
20 CAPSULE ORAL DAILY
Qty: 30 CAPSULE | Refills: 1 | Status: SHIPPED | OUTPATIENT
Start: 2024-09-20 | End: 2024-11-19

## 2024-09-20 NOTE — TELEPHONE ENCOUNTER
Patient on [facility]  Patient says she is doing well on the Prozac and would like to know if she can have a refill.  [facility] - [place], [place] - [address]  [address], [facility] [address]

## 2024-09-24 ENCOUNTER — DOCUMENTATION (OUTPATIENT)
Dept: PRIMARY CARE | Facility: CLINIC | Age: 54
End: 2024-09-24
Payer: COMMERCIAL

## 2024-09-24 DIAGNOSIS — F41.1 GENERALIZED ANXIETY DISORDER: Primary | ICD-10-CM

## 2024-09-24 PROCEDURE — 99493 SBSQ PSYC COLLAB CARE MGMT: CPT | Performed by: NURSE PRACTITIONER

## 2024-09-24 PROCEDURE — 99494 1ST/SBSQ PSYC COLLAB CARE: CPT | Performed by: NURSE PRACTITIONER

## 2024-10-02 ENCOUNTER — SOCIAL WORK (OUTPATIENT)
Dept: PRIMARY CARE | Facility: CLINIC | Age: 54
End: 2024-10-02

## 2024-10-02 ENCOUNTER — APPOINTMENT (OUTPATIENT)
Dept: PRIMARY CARE | Facility: CLINIC | Age: 54
End: 2024-10-02
Payer: COMMERCIAL
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
## 2024-10-16 ENCOUNTER — SOCIAL WORK (OUTPATIENT)
Dept: PRIMARY CARE | Facility: CLINIC | Age: 54
End: 2024-10-16

## 2024-10-16 ENCOUNTER — APPOINTMENT (OUTPATIENT)
Dept: PRIMARY CARE | Facility: CLINIC | Age: 54
End: 2024-10-16
Payer: COMMERCIAL

## 2024-10-16 ASSESSMENT — PATIENT HEALTH QUESTIONNAIRE - PHQ9
6. FEELING BAD ABOUT YOURSELF - OR THAT YOU ARE A FAILURE OR HAVE LET YOURSELF OR YOUR FAMILY DOWN: SEVERAL DAYS
2. FEELING DOWN, DEPRESSED OR HOPELESS: NOT AT ALL
5. POOR APPETITE OR OVEREATING: NOT AT ALL
SUM OF ALL RESPONSES TO PHQ9 QUESTIONS 1 & 2: 0
SUM OF ALL RESPONSES TO PHQ QUESTIONS 1-9: 1
1. LITTLE INTEREST OR PLEASURE IN DOING THINGS: NOT AT ALL
3. TROUBLE FALLING OR STAYING ASLEEP: NOT AT ALL
7. TROUBLE CONCENTRATING ON THINGS, SUCH AS READING THE NEWSPAPER OR WATCHING TELEVISION: NOT AT ALL
4. FEELING TIRED OR HAVING LITTLE ENERGY: NOT AT ALL
8. MOVING OR SPEAKING SO SLOWLY THAT OTHER PEOPLE COULD HAVE NOTICED. OR THE OPPOSITE, BEING SO FIGETY OR RESTLESS THAT YOU HAVE BEEN MOVING AROUND A LOT MORE THAN USUAL: NOT AT ALL
10. IF YOU CHECKED OFF ANY PROBLEMS, HOW DIFFICULT HAVE THESE PROBLEMS MADE IT FOR YOU TO DO YOUR WORK, TAKE CARE OF THINGS AT HOME, OR GET ALONG WITH OTHER PEOPLE: NOT DIFFICULT AT ALL
9. THOUGHTS THAT YOU WOULD BE BETTER OFF DEAD, OR OF HURTING YOURSELF: NOT AT ALL

## 2024-10-16 ASSESSMENT — ANXIETY QUESTIONNAIRES
6. BECOMING EASILY ANNOYED OR IRRITABLE: SEVERAL DAYS
GAD7 TOTAL SCORE: 1
IF YOU CHECKED OFF ANY PROBLEMS ON THIS QUESTIONNAIRE, HOW DIFFICULT HAVE THESE PROBLEMS MADE IT FOR YOU TO DO YOUR WORK, TAKE CARE OF THINGS AT HOME, OR GET ALONG WITH OTHER PEOPLE: NOT DIFFICULT AT ALL
5. BEING SO RESTLESS THAT IT IS HARD TO SIT STILL: NOT AT ALL
7. FEELING AFRAID AS IF SOMETHING AWFUL MIGHT HAPPEN: NOT AT ALL
3. WORRYING TOO MUCH ABOUT DIFFERENT THINGS: NOT AT ALL
2. NOT BEING ABLE TO STOP OR CONTROL WORRYING: NOT AT ALL
1. FEELING NERVOUS, ANXIOUS, OR ON EDGE: NOT AT ALL
4. TROUBLE RELAXING: NOT AT ALL
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
## 2024-10-29 ENCOUNTER — SOCIAL WORK (OUTPATIENT)
Dept: PRIMARY CARE | Facility: CLINIC | Age: 54
End: 2024-10-29

## 2024-10-29 ENCOUNTER — APPOINTMENT (OUTPATIENT)
Dept: PRIMARY CARE | Facility: CLINIC | Age: 54
End: 2024-10-29
Payer: COMMERCIAL

## 2024-10-31 ENCOUNTER — DOCUMENTATION (OUTPATIENT)
Dept: PRIMARY CARE | Facility: CLINIC | Age: 54
End: 2024-10-31
Payer: COMMERCIAL

## 2024-10-31 DIAGNOSIS — F41.1 GENERALIZED ANXIETY DISORDER: Primary | ICD-10-CM

## 2024-10-31 PROCEDURE — 99494 1ST/SBSQ PSYC COLLAB CARE: CPT | Performed by: NURSE PRACTITIONER

## 2024-10-31 PROCEDURE — 99493 SBSQ PSYC COLLAB CARE MGMT: CPT | Performed by: NURSE PRACTITIONER

## 2024-11-12 ENCOUNTER — TELEPHONE (OUTPATIENT)
Dept: PRIMARY CARE | Facility: CLINIC | Age: 54
End: 2024-11-12

## 2024-11-12 ENCOUNTER — APPOINTMENT (OUTPATIENT)
Dept: PRIMARY CARE | Facility: CLINIC | Age: 54
End: 2024-11-12
Payer: COMMERCIAL

## 2024-11-12 NOTE — PROGRESS NOTES
Writer outreached pt due to no show for today's appointment. Pt informed writer she forgot due to  being out of town and getting caught up in working on the house. Pt stated she is in a really good place and no concerns and would like to reschedule to next Tuesday, 11/19 at 11am.

## 2024-11-19 ENCOUNTER — SOCIAL WORK (OUTPATIENT)
Dept: PRIMARY CARE | Facility: CLINIC | Age: 54
End: 2024-11-19

## 2024-11-19 ENCOUNTER — APPOINTMENT (OUTPATIENT)
Dept: PRIMARY CARE | Facility: CLINIC | Age: 54
End: 2024-11-19
Payer: MEDICARE

## 2024-11-19 ENCOUNTER — TELEPHONE (OUTPATIENT)
Dept: PRIMARY CARE | Facility: CLINIC | Age: 54
End: 2024-11-19

## 2024-11-19 ASSESSMENT — PATIENT HEALTH QUESTIONNAIRE - PHQ9
10. IF YOU CHECKED OFF ANY PROBLEMS, HOW DIFFICULT HAVE THESE PROBLEMS MADE IT FOR YOU TO DO YOUR WORK, TAKE CARE OF THINGS AT HOME, OR GET ALONG WITH OTHER PEOPLE: NOT DIFFICULT AT ALL
8. MOVING OR SPEAKING SO SLOWLY THAT OTHER PEOPLE COULD HAVE NOTICED. OR THE OPPOSITE, BEING SO FIGETY OR RESTLESS THAT YOU HAVE BEEN MOVING AROUND A LOT MORE THAN USUAL: NOT AT ALL
SUM OF ALL RESPONSES TO PHQ QUESTIONS 1-9: 2
4. FEELING TIRED OR HAVING LITTLE ENERGY: SEVERAL DAYS
5. POOR APPETITE OR OVEREATING: NOT AT ALL
6. FEELING BAD ABOUT YOURSELF - OR THAT YOU ARE A FAILURE OR HAVE LET YOURSELF OR YOUR FAMILY DOWN: NOT AT ALL
1. LITTLE INTEREST OR PLEASURE IN DOING THINGS: NOT AT ALL
SUM OF ALL RESPONSES TO PHQ9 QUESTIONS 1 & 2: 0
7. TROUBLE CONCENTRATING ON THINGS, SUCH AS READING THE NEWSPAPER OR WATCHING TELEVISION: NOT AT ALL
9. THOUGHTS THAT YOU WOULD BE BETTER OFF DEAD, OR OF HURTING YOURSELF: NOT AT ALL
3. TROUBLE FALLING OR STAYING ASLEEP: SEVERAL DAYS
2. FEELING DOWN, DEPRESSED OR HOPELESS: NOT AT ALL

## 2024-11-19 ASSESSMENT — ANXIETY QUESTIONNAIRES
IF YOU CHECKED OFF ANY PROBLEMS ON THIS QUESTIONNAIRE, HOW DIFFICULT HAVE THESE PROBLEMS MADE IT FOR YOU TO DO YOUR WORK, TAKE CARE OF THINGS AT HOME, OR GET ALONG WITH OTHER PEOPLE: NOT DIFFICULT AT ALL
5. BEING SO RESTLESS THAT IT IS HARD TO SIT STILL: NOT AT ALL
1. FEELING NERVOUS, ANXIOUS, OR ON EDGE: NOT AT ALL
2. NOT BEING ABLE TO STOP OR CONTROL WORRYING: NOT AT ALL
3. WORRYING TOO MUCH ABOUT DIFFERENT THINGS: SEVERAL DAYS
6. BECOMING EASILY ANNOYED OR IRRITABLE: SEVERAL DAYS
4. TROUBLE RELAXING: NOT AT ALL
GAD7 TOTAL SCORE: 2
7. FEELING AFRAID AS IF SOMETHING AWFUL MIGHT HAPPEN: NOT AT ALL

## 2024-11-19 NOTE — PROGRESS NOTES
Collaborative Care (CoCM)  Progress Note    Type of Interaction: In Office    Start Time: 1015am    End Time: 11am    Appointment: Scheduled    Reason for Visit:   Chief Complaint   Patient presents with    Depression    Follow up    Interval History / Patient Symptoms:     Patient Health Questionnaire-9 Score: 2 (11/19/2024 11:27 AM)  RON-7 Total Score: 2 (11/19/2024 11:27 AM)    Interventions Provided: [place] Setting and Communication Training    Progress Made: Significant    Response to Intervention:   -Pt and writer explored and reviewed pt's progress since starting CoCM services, in specific, the way she has found acceptance, proper medication, and improved relationship with her spouse significantly.  -Writer and pt discussed options for therapy once writer is gone and scheduled final appointment.  -Pt disclosed she would like to finish with writer and see how the next couple of months go and decide to return to therapy at that time if needed.  -Pt and writer explored boundaries with her daughter and pt disclosed feeling like it must be her that is the problem and not financial due to her daughter still living the lifestyle she has been accustomed to.  -Writer encouraged pt to review with natural supports and run her thought processes by them to get alternative views.   -Pt stated she did do a pros and cons list for having a relationship with her daughter and had 5 pros and 2 pages worth of cons.   -Writer encouraged pt to continue with strong boundaries in all her relationships to maintain the peace she has now.     Plan: Pt to work on developing a list of what she is specifically resentful of her daughter about. Assess progress pt has made at meeting self-care needs and engaging in positive coping mechanisms at next appt.      Care Plan    There is no care plan documentation to display.       Patient Instructions   Follow up scheduled on 12/4/2024 @ 10am in person.     Follow Up / Next Appointment: Next  appointment: 12/04/24

## 2024-11-20 ENCOUNTER — APPOINTMENT (OUTPATIENT)
Dept: PRIMARY CARE | Facility: CLINIC | Age: 54
End: 2024-11-20
Payer: MEDICARE

## 2024-11-29 ENCOUNTER — DOCUMENTATION (OUTPATIENT)
Dept: PRIMARY CARE | Facility: CLINIC | Age: 54
End: 2024-11-29
Payer: MEDICARE

## 2024-11-29 DIAGNOSIS — F41.1 GENERALIZED ANXIETY DISORDER: Primary | ICD-10-CM

## 2024-12-04 ENCOUNTER — SOCIAL WORK (OUTPATIENT)
Dept: PRIMARY CARE | Facility: CLINIC | Age: 54
End: 2024-12-04

## 2024-12-04 ENCOUNTER — APPOINTMENT (OUTPATIENT)
Dept: PRIMARY CARE | Facility: CLINIC | Age: 54
End: 2024-12-04
Payer: MEDICARE

## 2024-12-04 ASSESSMENT — ANXIETY QUESTIONNAIRES
6. BECOMING EASILY ANNOYED OR IRRITABLE: NOT AT ALL
7. FEELING AFRAID AS IF SOMETHING AWFUL MIGHT HAPPEN: NOT AT ALL
4. TROUBLE RELAXING: NOT AT ALL
GAD7 TOTAL SCORE: 0
IF YOU CHECKED OFF ANY PROBLEMS ON THIS QUESTIONNAIRE, HOW DIFFICULT HAVE THESE PROBLEMS MADE IT FOR YOU TO DO YOUR WORK, TAKE CARE OF THINGS AT HOME, OR GET ALONG WITH OTHER PEOPLE: NOT DIFFICULT AT ALL
5. BEING SO RESTLESS THAT IT IS HARD TO SIT STILL: NOT AT ALL
3. WORRYING TOO MUCH ABOUT DIFFERENT THINGS: NOT AT ALL
2. NOT BEING ABLE TO STOP OR CONTROL WORRYING: NOT AT ALL
1. FEELING NERVOUS, ANXIOUS, OR ON EDGE: NOT AT ALL

## 2024-12-04 ASSESSMENT — PATIENT HEALTH QUESTIONNAIRE - PHQ9
10. IF YOU CHECKED OFF ANY PROBLEMS, HOW DIFFICULT HAVE THESE PROBLEMS MADE IT FOR YOU TO DO YOUR WORK, TAKE CARE OF THINGS AT HOME, OR GET ALONG WITH OTHER PEOPLE: NOT DIFFICULT AT ALL
3. TROUBLE FALLING OR STAYING ASLEEP: NOT AT ALL
9. THOUGHTS THAT YOU WOULD BE BETTER OFF DEAD, OR OF HURTING YOURSELF: NOT AT ALL
SUM OF ALL RESPONSES TO PHQ9 QUESTIONS 1 & 2: 0
5. POOR APPETITE OR OVEREATING: NOT AT ALL
1. LITTLE INTEREST OR PLEASURE IN DOING THINGS: NOT AT ALL
SUM OF ALL RESPONSES TO PHQ QUESTIONS 1-9: 0
7. TROUBLE CONCENTRATING ON THINGS, SUCH AS READING THE NEWSPAPER OR WATCHING TELEVISION: NOT AT ALL
4. FEELING TIRED OR HAVING LITTLE ENERGY: NOT AT ALL
8. MOVING OR SPEAKING SO SLOWLY THAT OTHER PEOPLE COULD HAVE NOTICED. OR THE OPPOSITE, BEING SO FIGETY OR RESTLESS THAT YOU HAVE BEEN MOVING AROUND A LOT MORE THAN USUAL: NOT AT ALL
2. FEELING DOWN, DEPRESSED OR HOPELESS: NOT AT ALL
6. FEELING BAD ABOUT YOURSELF - OR THAT YOU ARE A FAILURE OR HAVE LET YOURSELF OR YOUR FAMILY DOWN: NOT AT ALL

## 2024-12-04 NOTE — PROGRESS NOTES
Collaborative Care (CoCM)  Progress Note    Type of Interaction: In Office    Start Time: 10am,    End Time: 1025pm    Appointment: Scheduled    Reason for Visit:   Chief Complaint   Patient presents with    Depression    Follow up    Interval History / Patient Symptoms:     Patient Health Questionnaire-9 Score: 0 (12/4/2024 10:48 AM)  RON-7 Total Score: 0 (12/4/2024 10:47 AM)    Interventions Provided: Review Progress/Goals Stress Management    Progress Made: Significant    Response to Intervention:   -Pt and writer finalized CoCM services and reviewed progress and goals met. Pt reported significant improvement in symptoms, relationships, boundaries, assertiveness, and acceptance. Writer congratulated pt on all her progress and provided resources in the event pt would need therapy in the future.     Plan: N/A, pt graduated and finalized CoCM services.    Care Plan    There is no care plan documentation to display.       Patient Instructions   N/A, final session    Follow Up / Next Appointment:  N/A

## 2024-12-10 ENCOUNTER — DOCUMENTATION (OUTPATIENT)
Dept: PRIMARY CARE | Facility: CLINIC | Age: 54
End: 2024-12-10
Payer: MEDICARE

## 2024-12-10 DIAGNOSIS — F41.1 GENERALIZED ANXIETY DISORDER: Primary | ICD-10-CM

## 2024-12-11 ENCOUNTER — DOCUMENTATION (OUTPATIENT)
Dept: PRIMARY CARE | Facility: CLINIC | Age: 54
End: 2024-12-11
Payer: MEDICARE

## 2024-12-13 ENCOUNTER — TELEMEDICINE (OUTPATIENT)
Dept: PRIMARY CARE | Facility: CLINIC | Age: 54
End: 2024-12-13
Payer: MEDICARE

## 2024-12-13 DIAGNOSIS — J01.90 ACUTE NON-RECURRENT SINUSITIS, UNSPECIFIED LOCATION: Primary | ICD-10-CM

## 2024-12-13 PROCEDURE — 99441 PR PHYS/QHP TELEPHONE EVALUATION 5-10 MIN: CPT | Performed by: NURSE PRACTITIONER

## 2024-12-13 RX ORDER — AMOXICILLIN AND CLAVULANATE POTASSIUM 875; 125 MG/1; MG/1
875 TABLET, FILM COATED ORAL 2 TIMES DAILY
Qty: 20 TABLET | Refills: 0 | Status: SHIPPED | OUTPATIENT
Start: 2024-12-13 | End: 2024-12-23

## 2024-12-13 ASSESSMENT — ENCOUNTER SYMPTOMS
COUGH: 1
HEADACHES: 1
SINUS PRESSURE: 1
WEAKNESS: 0
PALPITATIONS: 0
SORE THROAT: 1
FEVER: 0
DIZZINESS: 0
SHORTNESS OF BREATH: 0
SINUS PAIN: 0
CHILLS: 0

## 2024-12-13 NOTE — PROGRESS NOTES
Subjective   Patient ID: Louise Sanford is a 54 y.o. female who presents for Sinusitis.    Virtual or Telephone Consent    A telephone visit (audio only) between the patient (at the originating site) and the provider (at the distant site) was utilized to provide this telehealth service.   Verbal consent was requested and obtained from Louise Sanford on this date, 12/13/24 for a telehealth visit.      Sinusitis  This is a new problem. The current episode started 1 to 4 weeks ago. The problem has been gradually worsening since onset. There has been no fever. Associated symptoms include congestion, coughing, ear pain, headaches, sinus pressure, sneezing and a sore throat. Pertinent negatives include no chills or shortness of breath. (Post nasal drip, runny/stuffy nose) Past treatments include oral decongestants. The treatment provided mild relief.       Review of Systems   Constitutional:  Negative for chills and fever.   HENT:  Positive for congestion, ear pain, sinus pressure, sneezing and sore throat. Negative for sinus pain.    Respiratory:  Positive for cough. Negative for shortness of breath.    Cardiovascular:  Negative for chest pain and palpitations.   Neurological:  Positive for headaches. Negative for dizziness and weakness.       Objective   There were no vitals taken for this visit. No vitals taken with virtual visit- phone encounter only    Physical Exam No physical exam done with virtual visit- phone encounter only    Assessment/Plan   Problem List Items Addressed This Visit    None  Visit Diagnoses         Codes    Acute non-recurrent sinusitis, unspecified location    -  Primary J01.90    Relevant Medications    amoxicillin-pot clavulanate (Augmentin) 875-125 mg tablet        Increase rest and fluids.   Start Augmentin as directed.   Follow up in 1 week with any persisting symptoms, or sooner with any additional concerns.

## 2025-01-11 DIAGNOSIS — F41.8 DEPRESSION WITH ANXIETY: ICD-10-CM

## 2025-01-16 RX ORDER — FLUOXETINE HYDROCHLORIDE 20 MG/1
20 CAPSULE ORAL DAILY
Qty: 90 CAPSULE | Refills: 0 | Status: SHIPPED | OUTPATIENT
Start: 2025-01-16

## 2025-03-19 ENCOUNTER — OFFICE VISIT (OUTPATIENT)
Dept: URGENT CARE | Age: 55
End: 2025-03-19
Payer: MEDICARE

## 2025-03-19 VITALS
TEMPERATURE: 97.3 F | SYSTOLIC BLOOD PRESSURE: 124 MMHG | BODY MASS INDEX: 27.81 KG/M2 | WEIGHT: 157 LBS | OXYGEN SATURATION: 98 % | DIASTOLIC BLOOD PRESSURE: 71 MMHG | RESPIRATION RATE: 16 BRPM | HEART RATE: 80 BPM

## 2025-03-19 DIAGNOSIS — B33.8 RSV (RESPIRATORY SYNCYTIAL VIRUS INFECTION): Primary | ICD-10-CM

## 2025-03-19 LAB
POC HUMAN RHINOVIRUS PCR: POSITIVE
POC INFLUENZA A VIRUS PCR: NEGATIVE
POC INFLUENZA B VIRUS PCR: NEGATIVE
POC RESPIRATORY SYNCYTIAL VIRUS PCR: NEGATIVE
POC STREPTOCOCCUS PYOGENES (GROUP A STREP) PCR: NEGATIVE

## 2025-03-19 PROCEDURE — 1036F TOBACCO NON-USER: CPT | Performed by: NURSE PRACTITIONER

## 2025-03-19 PROCEDURE — 99203 OFFICE O/P NEW LOW 30 MIN: CPT | Performed by: NURSE PRACTITIONER

## 2025-03-19 PROCEDURE — 87631 RESP VIRUS 3-5 TARGETS: CPT | Performed by: NURSE PRACTITIONER

## 2025-03-19 PROCEDURE — 87651 STREP A DNA AMP PROBE: CPT | Performed by: NURSE PRACTITIONER

## 2025-03-19 ASSESSMENT — ENCOUNTER SYMPTOMS
SINUS PAIN: 0
CHILLS: 0
COUGH: 1
SINUS PRESSURE: 1
SHORTNESS OF BREATH: 0
FEVER: 0
SINUS COMPLAINT: 1
WHEEZING: 0
RHINORRHEA: 1
SORE THROAT: 1

## 2025-03-19 NOTE — PROGRESS NOTES
Subjective   Patient ID: Louise Sanford is a 54 y.o. female. They present today with a chief complaint of URI, Earache, Sinus Problem, and Sinusitis.    History of Present Illness    Patient presents with URI symptoms, earache, sinus issues. Here with daughter who has similar symptoms that started 2 days ago. No CP, SOB, N/V/D. She has tried OTC with no relief.     URI  Presenting symptoms: congestion, cough, ear pain, rhinorrhea and sore throat    Presenting symptoms: no fever    Associated symptoms: no sinus pain and no wheezing    Earache   Associated symptoms include coughing, rhinorrhea and a sore throat.   Sinus Problem  Associated symptoms: congestion, cough, ear pain, rhinorrhea and sore throat    Associated symptoms: no chest pain, no fever, no shortness of breath and no wheezing    Sinusitis  Associated symptoms: congestion, cough, ear pain, rhinorrhea and sore throat    Associated symptoms: no chest pain, no chills, no fever, no shortness of breath and no wheezing        Past Medical History  Allergies as of 03/19/2025 - Reviewed 03/19/2025   Allergen Reaction Noted    Ibuprofen Anaphylaxis 08/07/2017    Alcohol Unknown 04/29/2015    Aspirin Unknown 05/17/2023    Celecoxib Unknown 08/28/2015    Cyclobenzaprine Unknown 05/17/2023    Diclofenac sodium Itching 08/14/2012    Levofloxacin Unknown 10/06/2023    Naproxen sodium Other 05/17/2023    Nsaids (non-steroidal anti-inflammatory drug) Unknown 03/30/2023    Quinolones Itching 02/26/2002    Sulfa (sulfonamide antibiotics) Other 03/30/2023    Sumatriptan Unknown 01/28/2010    Cefdinir Rash 01/28/2010       (Not in a hospital admission)       Past Medical History:   Diagnosis Date    Encounter for screening for malignant neoplasm of colon 09/11/2019    Screening for colon cancer    Personal history of other diseases of the respiratory system 01/03/2014    History of acute bronchitis    Personal history of other diseases of the respiratory system 01/17/2020     History of sore throat    Personal history of other diseases of the respiratory system 01/17/2020    History of acute pharyngitis    Personal history of other drug therapy 11/11/2019    History of influenza vaccination    Personal history of other specified conditions 01/03/2014    History of vomiting       Past Surgical History:   Procedure Laterality Date    OTHER SURGICAL HISTORY  08/30/2022    Breast reduction        reports that she has quit smoking. Her smoking use included cigarettes. She has never used smokeless tobacco. She reports that she does not drink alcohol and does not use drugs.    Review of Systems  Review of Systems   Constitutional:  Negative for chills and fever.   HENT:  Positive for congestion, ear pain, postnasal drip, rhinorrhea, sinus pressure and sore throat. Negative for sinus pain.    Respiratory:  Positive for cough. Negative for shortness of breath and wheezing.    Cardiovascular:  Negative for chest pain.         Objective    Vitals:    03/19/25 1434   BP: 124/71   Pulse: 80   Resp: 16   Temp: 36.3 °C (97.3 °F)   SpO2: 98%   Weight: 71.2 kg (157 lb)     No LMP recorded. Patient has had an ablation.    Physical Exam  Constitutional:       General: She is not in acute distress.     Appearance: Normal appearance. She is not ill-appearing or toxic-appearing.   HENT:      Head: Normocephalic and atraumatic.      Right Ear: No middle ear effusion. Tympanic membrane is not erythematous or bulging.      Left Ear:  No middle ear effusion. Tympanic membrane is not erythematous or bulging.      Nose:      Right Sinus: No maxillary sinus tenderness or frontal sinus tenderness.      Left Sinus: No maxillary sinus tenderness or frontal sinus tenderness.      Mouth/Throat:      Pharynx: Posterior oropharyngeal erythema and postnasal drip present. No pharyngeal swelling or oropharyngeal exudate.   Eyes:      General: Lids are normal.   Cardiovascular:      Rate and Rhythm: Normal rate and regular  rhythm.      Heart sounds: Normal heart sounds.   Pulmonary:      Effort: Pulmonary effort is normal. No respiratory distress.      Breath sounds: Normal breath sounds.   Skin:     General: Skin is warm and dry.   Neurological:      General: No focal deficit present.      Mental Status: She is alert and oriented to person, place, and time.   Psychiatric:         Attention and Perception: Attention normal.         Mood and Affect: Mood normal.         Speech: Speech normal.         Behavior: Behavior normal.         Thought Content: Thought content normal.         Procedures    Point of Care Test & Imaging Results from this visit  Results for orders placed or performed in visit on 03/19/25   POCT SPOTFIRE R/ST Panel Mini w/Strep A ([employer]) manually resulted   Result Value Ref Range    POC Group A Strep, PCR Negative Negative    POC Respiratory Syncytial Virus PCR Negative Negative    POC Influenza A Virus PCR Negative Negative    POC Influenza B Virus PCR Negative Negative    POC Human Rhinovirus PCR Positive (A) Negative      No results found.    Diagnostic study results (if any) were reviewed by KEN Brock.    Assessment/Plan   Allergies, medications, history, and pertinent labs/EKGs/Imaging reviewed by KEN Brock.     Medical Decision Making  Tested positive for RSV today. Conservative management.  At time of discharge patient was clinically well-appearing and HDS for outpatient management. She was educated regarding diagnosis, supportive care, OTC and Rx medications. She was given the opportunity to ask questions prior to discharge.  They verbalized understanding of my discussion of the plans for treatment, expected course, indications to return to  or seek further evaluation in ED, and the need for timely follow up as directed.       Orders and Diagnoses  Diagnoses and all orders for this visit:  RSV (respiratory syncytial virus infection)  -     POCT SPOTFIRE R/ST Panel Mini  w/Strep [facility] manually resulted      Medical Admin Record      Patient disposition: Home    Electronically signed by KEN Brock  3:30 PM

## 2025-05-02 DIAGNOSIS — E78.5 HYPERLIPIDEMIA, UNSPECIFIED: ICD-10-CM

## 2025-05-02 NOTE — TELEPHONE ENCOUNTER
Recent Visits  Date Type Provider Dept   12/04/24 Appointment SEEMA Joseph [facility]   11/19/24 Appointment Malachi Hopson [facility]   10/29/24 Appointment Malachi Hopson [facility]   10/16/24 Appointment Malachi Hopson [facility]   10/02/24 Appointment Malachi Hopson [facility]   09/17/24 Appointment Malachi Hopson [facility]   09/12/24 Appointment Malachi Hopson [facility]   08/21/24 Office Visit KEN Carrizales [facility]   07/22/24 Office Visit KEN Carrizales [facility]   05/09/24 Office Visit KEN Carrizales [facility]   Showing recent visits within past 365 days and meeting all other requirements  Future Appointments  No visits were found meeting these conditions.  Showing future appointments within next 90 days and meeting all other requirements

## 2025-05-05 RX ORDER — ROSUVASTATIN CALCIUM 20 MG/1
20 TABLET, COATED ORAL DAILY
Qty: 90 TABLET | Refills: 3 | Status: SHIPPED | OUTPATIENT
Start: 2025-05-05

## 2025-07-18 DIAGNOSIS — F41.8 DEPRESSION WITH ANXIETY: ICD-10-CM

## 2025-07-18 RX ORDER — FLUOXETINE 20 MG/1
20 CAPSULE ORAL DAILY
Qty: 30 CAPSULE | Refills: 0 | Status: SHIPPED | OUTPATIENT
Start: 2025-07-18

## 2025-07-21 ENCOUNTER — APPOINTMENT (OUTPATIENT)
Dept: PRIMARY CARE | Facility: CLINIC | Age: 55
End: 2025-07-21
Payer: MEDICARE

## 2025-07-21 VITALS
BODY MASS INDEX: 26.93 KG/M2 | DIASTOLIC BLOOD PRESSURE: 66 MMHG | SYSTOLIC BLOOD PRESSURE: 100 MMHG | RESPIRATION RATE: 16 BRPM | HEART RATE: 86 BPM | HEIGHT: 63 IN | WEIGHT: 152 LBS | TEMPERATURE: 97 F | OXYGEN SATURATION: 98 %

## 2025-07-21 DIAGNOSIS — Z12.11 SCREENING FOR COLON CANCER: ICD-10-CM

## 2025-07-21 DIAGNOSIS — Z12.31 ENCOUNTER FOR SCREENING MAMMOGRAM FOR MALIGNANT NEOPLASM OF BREAST: ICD-10-CM

## 2025-07-21 DIAGNOSIS — Z98.890 HISTORY OF HERNIA SURGERY: Primary | ICD-10-CM

## 2025-07-21 DIAGNOSIS — Z87.19 HISTORY OF HERNIA SURGERY: Primary | ICD-10-CM

## 2025-07-21 DIAGNOSIS — F41.8 DEPRESSION WITH ANXIETY: ICD-10-CM

## 2025-07-21 DIAGNOSIS — Z13.9 SCREENING DUE: ICD-10-CM

## 2025-07-21 PROCEDURE — 99214 OFFICE O/P EST MOD 30 MIN: CPT | Performed by: NURSE PRACTITIONER

## 2025-07-21 RX ORDER — FLUOXETINE 20 MG/1
20 CAPSULE ORAL DAILY
Qty: 90 CAPSULE | Refills: 1 | Status: SHIPPED | OUTPATIENT
Start: 2025-07-21

## 2025-07-21 ASSESSMENT — ENCOUNTER SYMPTOMS
DEPRESSION: 1
SLEEP DISTURBANCE: 1
WEAKNESS: 0
FEVER: 0
HEMATURIA: 0
DYSPHORIC MOOD: 1
ABDOMINAL PAIN: 0
DYSURIA: 0
SHORTNESS OF BREATH: 0
COUGH: 0
PALPITATIONS: 0
CHILLS: 0
BACK PAIN: 0
NUMBNESS: 0
NERVOUS/ANXIOUS: 1
DECREASED CONCENTRATION: 0
FATIGUE: 1

## 2025-07-21 NOTE — PROGRESS NOTES
"Subjective   Patient ID: Louise Sanford is a 54 y.o. female who presents for Anxiety.    Patient is being seen today for medication refills, She would like to get lab work done and needs a referral to a surgeon.      Anxiety  Symptoms include nervous/anxious behavior. Patient reports no chest pain, decreased concentration, palpitations, shortness of breath or suicidal ideas.         Depression Patient presents with the following symptoms: decreased concentration and nervousness/anxiety.  Patient is not experiencing: palpitations, shortness of breath and suicidal ideas.      53 year old female presents today for recheck of her anxiety/ depression. She states that she feels the fluoxetine is helping her. She is not letting her family stressors affect her as much as they had previously.  She states that her appetite is down. She denies any SI/HI.     She is also requesting a referral to a surgeon. She had a hernia repair on 1/2024 with Dr. Ayers. He is moving, and she is wanting to follow up.     Review of Systems   Constitutional:  Positive for fatigue. Negative for chills and fever.   Respiratory:  Negative for cough and shortness of breath.    Cardiovascular:  Negative for chest pain and palpitations.   Gastrointestinal:  Negative for abdominal pain.   Genitourinary:  Negative for dysuria and hematuria.   Musculoskeletal:  Negative for back pain.   Neurological:  Negative for weakness and numbness.   Psychiatric/Behavioral:  Positive for dysphoric mood and sleep disturbance. Negative for decreased concentration and suicidal ideas. The patient is nervous/anxious.        Objective   /66 (BP Location: Left arm, Patient Position: Sitting, BP Cuff Size: Adult)   Pulse 86   Temp 36.1 °C (97 °F) (Temporal)   Resp 16   Ht 1.6 m (5' 3\")   Wt 68.9 kg (152 lb)   SpO2 98%   BMI 26.93 kg/m²     Physical Exam  Vitals and nursing note reviewed.   Constitutional:       General: She is not in acute distress.     " Appearance: Normal appearance.     Cardiovascular:      Rate and Rhythm: Normal rate and regular rhythm.      Heart sounds: Normal heart sounds.   Pulmonary:      Effort: Pulmonary effort is normal.      Breath sounds: Normal breath sounds.   Abdominal:      General: Abdomen is flat.      Palpations: Abdomen is soft.      Tenderness: There is no abdominal tenderness.     Skin:     General: Skin is warm and dry.     Neurological:      Mental Status: She is alert and oriented to person, place, and time.     Psychiatric:         Mood and Affect: Mood normal.         Behavior: Behavior normal.         Assessment/Plan   Problem List Items Addressed This Visit    None  Visit Diagnoses         Codes      History of hernia surgery    -  Primary Z98.890, Z87.19    Relevant Orders    Referral to General Surgery      Depression with anxiety     F41.8    Relevant Medications    FLUoxetine (PROzac) 20 mg capsule    Other Relevant Orders    Comprehensive Metabolic Panel    CBC and Auto Differential    Tsh With Reflex To Free T4 If Abnormal      Screening due     Z13.9    Relevant Orders    Lipid panel      Screening for colon cancer     Z12.11    Relevant Orders    Cologuard® colon cancer screening      Encounter for screening mammogram for malignant neoplasm of breast     Z12.31    Relevant Orders    BI mammo bilateral screening tomosynthesis        Anxiety/Depression: Stable. Continue with Fluoxetine.   Check labs  Screening mammogram and cologuard ordered today.   Referral provided to General surgeon as requested.   Encouraged healthy diet and regular exercise.   Follow up in 6 months for recheck, or sooner with any additional concerns.

## 2025-07-22 LAB
ALBUMIN SERPL-MCNC: 4.3 G/DL (ref 3.6–5.1)
ALP SERPL-CCNC: 92 U/L (ref 37–153)
ALT SERPL-CCNC: 17 U/L (ref 6–29)
ANION GAP SERPL CALCULATED.4IONS-SCNC: 7 MMOL/L (CALC) (ref 7–17)
AST SERPL-CCNC: 18 U/L (ref 10–35)
BASOPHILS # BLD AUTO: 61 CELLS/UL (ref 0–200)
BASOPHILS NFR BLD AUTO: 0.8 %
BILIRUB SERPL-MCNC: 0.4 MG/DL (ref 0.2–1.2)
BUN SERPL-MCNC: 9 MG/DL (ref 7–25)
CALCIUM SERPL-MCNC: 8.8 MG/DL (ref 8.6–10.4)
CHLORIDE SERPL-SCNC: 105 MMOL/L (ref 98–110)
CHOLEST SERPL-MCNC: 140 MG/DL
CHOLEST/HDLC SERPL: 2.5 (CALC)
CO2 SERPL-SCNC: 28 MMOL/L (ref 20–32)
CREAT SERPL-MCNC: 0.95 MG/DL (ref 0.5–1.03)
EGFRCR SERPLBLD CKD-EPI 2021: 71 ML/MIN/1.73M2
EOSINOPHIL # BLD AUTO: 99 CELLS/UL (ref 15–500)
EOSINOPHIL NFR BLD AUTO: 1.3 %
ERYTHROCYTE [DISTWIDTH] IN BLOOD BY AUTOMATED COUNT: 13.5 % (ref 11–15)
GLUCOSE SERPL-MCNC: 87 MG/DL (ref 65–99)
HCT VFR BLD AUTO: 42.3 % (ref 35–45)
HDLC SERPL-MCNC: 55 MG/DL
HGB BLD-MCNC: 13.3 G/DL (ref 11.7–15.5)
LDLC SERPL CALC-MCNC: 62 MG/DL (CALC)
LYMPHOCYTES # BLD AUTO: 2607 CELLS/UL (ref 850–3900)
LYMPHOCYTES NFR BLD AUTO: 34.3 %
MCH RBC QN AUTO: 29.8 PG (ref 27–33)
MCHC RBC AUTO-ENTMCNC: 31.4 G/DL (ref 32–36)
MCV RBC AUTO: 94.6 FL (ref 80–100)
MONOCYTES # BLD AUTO: 524 CELLS/UL (ref 200–950)
MONOCYTES NFR BLD AUTO: 6.9 %
NEUTROPHILS # BLD AUTO: 4309 CELLS/UL (ref 1500–7800)
NEUTROPHILS NFR BLD AUTO: 56.7 %
NONHDLC SERPL-MCNC: 85 MG/DL (CALC)
PLATELET # BLD AUTO: 260 THOUSAND/UL (ref 140–400)
PMV BLD REES-ECKER: 11.6 FL (ref 7.5–12.5)
POTASSIUM SERPL-SCNC: 4.8 MMOL/L (ref 3.5–5.3)
PROT SERPL-MCNC: 6.4 G/DL (ref 6.1–8.1)
RBC # BLD AUTO: 4.47 MILLION/UL (ref 3.8–5.1)
SODIUM SERPL-SCNC: 140 MMOL/L (ref 135–146)
TRIGL SERPL-MCNC: 147 MG/DL
TSH SERPL-ACNC: 1.2 MIU/L
WBC # BLD AUTO: 7.6 THOUSAND/UL (ref 3.8–10.8)

## 2025-07-28 ENCOUNTER — APPOINTMENT (OUTPATIENT)
Dept: SURGERY | Facility: CLINIC | Age: 55
End: 2025-07-28
Payer: MEDICARE

## 2025-07-29 ENCOUNTER — HOSPITAL ENCOUNTER (OUTPATIENT)
Dept: RADIOLOGY | Facility: CLINIC | Age: 55
Discharge: HOME | End: 2025-07-29
Payer: MEDICARE

## 2025-07-29 ENCOUNTER — APPOINTMENT (OUTPATIENT)
Dept: SURGERY | Facility: CLINIC | Age: 55
End: 2025-07-29
Payer: MEDICARE

## 2025-07-29 VITALS — BODY MASS INDEX: 26.93 KG/M2 | WEIGHT: 152 LBS | HEIGHT: 63 IN

## 2025-07-29 VITALS
WEIGHT: 149.4 LBS | TEMPERATURE: 97.5 F | DIASTOLIC BLOOD PRESSURE: 80 MMHG | BODY MASS INDEX: 26.47 KG/M2 | HEIGHT: 63 IN | RESPIRATION RATE: 16 BRPM | HEART RATE: 87 BPM | SYSTOLIC BLOOD PRESSURE: 116 MMHG | OXYGEN SATURATION: 98 %

## 2025-07-29 DIAGNOSIS — Z98.890 S/P NISSEN FUNDOPLICATION (WITHOUT GASTROSTOMY TUBE) PROCEDURE: ICD-10-CM

## 2025-07-29 DIAGNOSIS — K92.89 GAS BLOAT SYNDROME: ICD-10-CM

## 2025-07-29 DIAGNOSIS — K21.9 GASTROESOPHAGEAL REFLUX DISEASE, UNSPECIFIED WHETHER ESOPHAGITIS PRESENT: Primary | Chronic | ICD-10-CM

## 2025-07-29 DIAGNOSIS — Z12.31 ENCOUNTER FOR SCREENING MAMMOGRAM FOR MALIGNANT NEOPLASM OF BREAST: ICD-10-CM

## 2025-07-29 DIAGNOSIS — K21.00 GASTROESOPHAGEAL REFLUX DISEASE WITH ESOPHAGITIS, UNSPECIFIED WHETHER HEMORRHAGE: Chronic | ICD-10-CM

## 2025-07-29 PROCEDURE — 99203 OFFICE O/P NEW LOW 30 MIN: CPT | Performed by: SURGERY

## 2025-07-29 PROCEDURE — 77067 SCR MAMMO BI INCL CAD: CPT | Performed by: STUDENT IN AN ORGANIZED HEALTH CARE EDUCATION/TRAINING PROGRAM

## 2025-07-29 PROCEDURE — 77063 BREAST TOMOSYNTHESIS BI: CPT | Performed by: STUDENT IN AN ORGANIZED HEALTH CARE EDUCATION/TRAINING PROGRAM

## 2025-07-29 PROCEDURE — 3008F BODY MASS INDEX DOCD: CPT | Performed by: SURGERY

## 2025-07-29 PROCEDURE — 77067 SCR MAMMO BI INCL CAD: CPT

## 2025-07-29 ASSESSMENT — ENCOUNTER SYMPTOMS
DYSPHORIC MOOD: 1
RESPIRATORY NEGATIVE: 1
CARDIOVASCULAR NEGATIVE: 1
NEUROLOGICAL NEGATIVE: 1
UNEXPECTED WEIGHT CHANGE: 1
MUSCULOSKELETAL NEGATIVE: 1
ABDOMINAL PAIN: 1
ALLERGIC/IMMUNOLOGIC NEGATIVE: 1
SLEEP DISTURBANCE: 1
ENDOCRINE NEGATIVE: 1
NAUSEA: 1
FATIGUE: 1
HEMATOLOGIC/LYMPHATIC NEGATIVE: 1

## 2025-07-29 NOTE — H&P (VIEW-ONLY)
Subjective   Patient ID: Louise Sanford is a 54 y.o. female who presents for New Patient Visit, Hernia (NPV- Hiatal hernia surgery done 1/26/24), and Heartburn (Patient having Heartburn and pain. Patient unable to bleach or vomit since surgery. ).  Patient underwent lap hiatal hernia repair with Nissen fundoplication in January 2024 due to persistent reflux. Reports initially had a severe pain episode and was seen in the ER, but work-up was negative. Following this, recovered well, but did not have complete resolution of reflux. More recently, however, patient is experiencing frequent epigastric pain episodes with flushing, nausea, but inability to belch or vomit. Patient feels severe distress during these episodes and has even considered self harm to stop them from happening. Patient currently on stable anti-depressant regimen, but admits to not seeing a psychologist. Patient only saw her surgeon for her post-op visit, but then had a change in insurance and has not seen him since. Patient is able to ingest liquids and most solids. Does not have solid food regurgitation. Has not had any imaging or endoscopy since her surgery. Also did not undergo a manometry pre-op.     Of note, patient reports losing 30 lbs since worsening pain episodes have started.         Review of Systems   Constitutional:  Positive for fatigue and unexpected weight change.   HENT: Negative.     Respiratory: Negative.     Cardiovascular: Negative.    Gastrointestinal:  Positive for abdominal pain and nausea.   Endocrine: Negative.    Genitourinary: Negative.    Musculoskeletal: Negative.    Skin: Negative.    Allergic/Immunologic: Negative.    Neurological: Negative.    Hematological: Negative.    Psychiatric/Behavioral:  Positive for dysphoric mood and sleep disturbance.        Objective   Physical Exam  Vitals reviewed.   Constitutional:       Appearance: Normal appearance.   HENT:      Head: Normocephalic and atraumatic.      Cardiovascular:      Rate and Rhythm: Normal rate and regular rhythm.   Pulmonary:      Effort: Pulmonary effort is normal.      Breath sounds: Normal breath sounds.   Abdominal:      General: Abdomen is flat. There is no distension.      Palpations: Abdomen is soft. There is no mass.      Tenderness: There is abdominal tenderness. There is no guarding.      Hernia: No hernia is present.      Comments: Tender across abdomen, mildly distended. Well-healed incisions.      Musculoskeletal:         General: Normal range of motion.     Skin:     General: Skin is warm and dry.     Neurological:      General: No focal deficit present.      Mental Status: She is alert and oriented to person, place, and time.     Psychiatric:         Mood and Affect: Mood normal.         Behavior: Behavior normal.         Assessment/Plan   Diagnoses and all orders for this visit:  Gastroesophageal reflux disease, unspecified whether esophagitis present  Gas bloat syndrome  S/P Nissen fundoplication (without gastrostomy tube) procedure  Other orders  -     Patient presenting with symptoms of tight wrap, but may also be esophageal dysphagia. Review of op note shows wrap not done over a bougie, but completion EGD was able to pass without issues. Will obtain UGI and plan for EGD to better assess.          Mina Terrazas MD   General Surgery   07/29/25 1:29 PM

## 2025-07-30 ENCOUNTER — APPOINTMENT (OUTPATIENT)
Dept: PRIMARY CARE | Facility: CLINIC | Age: 55
End: 2025-07-30
Payer: MEDICARE

## 2025-07-31 DIAGNOSIS — R92.8 ABNORMAL MAMMOGRAM: Primary | ICD-10-CM

## 2025-08-05 ENCOUNTER — ANESTHESIA (OUTPATIENT)
Dept: GASTROENTEROLOGY | Facility: HOSPITAL | Age: 55
End: 2025-08-05
Payer: MEDICARE

## 2025-08-05 ENCOUNTER — HOSPITAL ENCOUNTER (OUTPATIENT)
Dept: GASTROENTEROLOGY | Facility: HOSPITAL | Age: 55
Discharge: HOME | End: 2025-08-05
Payer: MEDICARE

## 2025-08-05 ENCOUNTER — ANESTHESIA EVENT (OUTPATIENT)
Dept: GASTROENTEROLOGY | Facility: HOSPITAL | Age: 55
End: 2025-08-05
Payer: MEDICARE

## 2025-08-05 VITALS
SYSTOLIC BLOOD PRESSURE: 113 MMHG | RESPIRATION RATE: 16 BRPM | HEART RATE: 69 BPM | BODY MASS INDEX: 26.4 KG/M2 | OXYGEN SATURATION: 100 % | DIASTOLIC BLOOD PRESSURE: 70 MMHG | TEMPERATURE: 96.8 F | HEIGHT: 63 IN | WEIGHT: 149 LBS

## 2025-08-05 DIAGNOSIS — K21.00 GASTROESOPHAGEAL REFLUX DISEASE WITH ESOPHAGITIS, UNSPECIFIED WHETHER HEMORRHAGE: Primary | Chronic | ICD-10-CM

## 2025-08-05 PROCEDURE — A43235 PR ESOPHAGOGASTRODUODENOSCOPY TRANSORAL DIAGNOSTIC: Performed by: ANESTHESIOLOGY

## 2025-08-05 PROCEDURE — 3700000001 HC GENERAL ANESTHESIA TIME - INITIAL BASE CHARGE

## 2025-08-05 PROCEDURE — A43235 PR ESOPHAGOGASTRODUODENOSCOPY TRANSORAL DIAGNOSTIC: Performed by: NURSE ANESTHETIST, CERTIFIED REGISTERED

## 2025-08-05 PROCEDURE — 43235 EGD DIAGNOSTIC BRUSH WASH: CPT | Performed by: INTERNAL MEDICINE

## 2025-08-05 PROCEDURE — 7100000010 HC PHASE TWO TIME - EACH INCREMENTAL 1 MINUTE

## 2025-08-05 PROCEDURE — 7100000009 HC PHASE TWO TIME - INITIAL BASE CHARGE

## 2025-08-05 PROCEDURE — 3700000002 HC GENERAL ANESTHESIA TIME - EACH INCREMENTAL 1 MINUTE

## 2025-08-05 PROCEDURE — 2500000004 HC RX 250 GENERAL PHARMACY W/ HCPCS (ALT 636 FOR OP/ED): Performed by: NURSE ANESTHETIST, CERTIFIED REGISTERED

## 2025-08-05 RX ORDER — LIDOCAINE HYDROCHLORIDE 20 MG/ML
INJECTION, SOLUTION INFILTRATION; PERINEURAL AS NEEDED
Status: DISCONTINUED | OUTPATIENT
Start: 2025-08-05 | End: 2025-08-05

## 2025-08-05 RX ORDER — SODIUM CHLORIDE, SODIUM LACTATE, POTASSIUM CHLORIDE, CALCIUM CHLORIDE 600; 310; 30; 20 MG/100ML; MG/100ML; MG/100ML; MG/100ML
INJECTION, SOLUTION INTRAVENOUS CONTINUOUS PRN
Status: DISCONTINUED | OUTPATIENT
Start: 2025-08-05 | End: 2025-08-05

## 2025-08-05 RX ORDER — PROPOFOL 10 MG/ML
INJECTION, EMULSION INTRAVENOUS AS NEEDED
Status: DISCONTINUED | OUTPATIENT
Start: 2025-08-05 | End: 2025-08-05

## 2025-08-05 RX ORDER — FENTANYL CITRATE 50 UG/ML
INJECTION, SOLUTION INTRAMUSCULAR; INTRAVENOUS AS NEEDED
Status: DISCONTINUED | OUTPATIENT
Start: 2025-08-05 | End: 2025-08-05

## 2025-08-05 RX ADMIN — LIDOCAINE HYDROCHLORIDE 50 MG: 20 INJECTION, SOLUTION INFILTRATION; PERINEURAL at 11:24

## 2025-08-05 RX ADMIN — SODIUM CHLORIDE, SODIUM LACTATE, POTASSIUM CHLORIDE, AND CALCIUM CHLORIDE: .6; .31; .03; .02 INJECTION, SOLUTION INTRAVENOUS at 11:22

## 2025-08-05 RX ADMIN — FENTANYL CITRATE 50 MCG: 50 INJECTION, SOLUTION INTRAMUSCULAR; INTRAVENOUS at 11:24

## 2025-08-05 RX ADMIN — PROPOFOL 100 MG: 10 INJECTION, EMULSION INTRAVENOUS at 11:25

## 2025-08-05 SDOH — HEALTH STABILITY: MENTAL HEALTH: CURRENT SMOKER: 0

## 2025-08-05 ASSESSMENT — COLUMBIA-SUICIDE SEVERITY RATING SCALE - C-SSRS
1. IN THE PAST MONTH, HAVE YOU WISHED YOU WERE DEAD OR WISHED YOU COULD GO TO SLEEP AND NOT WAKE UP?: NO
2. HAVE YOU ACTUALLY HAD ANY THOUGHTS OF KILLING YOURSELF?: NO
6. HAVE YOU EVER DONE ANYTHING, STARTED TO DO ANYTHING, OR PREPARED TO DO ANYTHING TO END YOUR LIFE?: NO

## 2025-08-05 ASSESSMENT — PAIN SCALES - GENERAL
PAINLEVEL_OUTOF10: 0 - NO PAIN
PAINLEVEL_OUTOF10: 0 - NO PAIN
PAINLEVEL_OUTOF10: 3

## 2025-08-05 ASSESSMENT — PAIN - FUNCTIONAL ASSESSMENT
PAIN_FUNCTIONAL_ASSESSMENT: 0-10

## 2025-08-05 ASSESSMENT — PAIN DESCRIPTION - DESCRIPTORS: DESCRIPTORS: ACHING

## 2025-08-05 NOTE — DISCHARGE INSTRUCTIONS

## 2025-08-05 NOTE — ANESTHESIA POSTPROCEDURE EVALUATION
Patient: Louise Sanford    Procedure Summary       Date: 08/05/25 Room / Location: [facility]    Anesthesia Start: 1122 Anesthesia Stop: 1136    Procedure: EGD Diagnosis:       Gastroesophageal reflux disease with esophagitis, unspecified whether hemorrhage      Gastroesophageal reflux disease with esophagitis, unspecified whether hemorrhage    Scheduled Providers: Kevin Ocasio MD Responsible Provider: Iris Johnson MD    Anesthesia Type: MAC, general ASA Status: 3            Anesthesia Type: MAC, general    Vitals Value Taken Time   /62 08/05/25 11:37   Temp 36.1 08/05/25 11:37   Pulse 62 08/05/25 11:37   Resp 16 08/05/25 11:37   SpO2 98 08/05/25 11:37       Anesthesia Post Evaluation    Patient location during evaluation: PACU  Patient participation: complete - patient participated  Level of consciousness: awake  Pain management: adequate  Multimodal analgesia pain management approach  Airway patency: patent  Cardiovascular status: acceptable, hemodynamically stable and stable  Respiratory status: acceptable and room air  Hydration status: acceptable  Postoperative Nausea and Vomiting: none        No notable events documented.

## 2025-08-05 NOTE — ANESTHESIA PREPROCEDURE EVALUATION
Patient: Louise Sanford    Procedure Information       Date/Time: 08/05/25 1200    Scheduled providers: Kevin Ocasio MD    Procedure: EGD    Location: [facility] [place]            Relevant Problems   Cardiac   (+) Atypical chest pain   (+) Hyperlipidemia      Neuro   (+) Anxiety disorder   (+) Depressive disorder   (+) Neuritis of right ulnar nerve      GI   (+) GERD (gastroesophageal reflux disease)      Musculoskeletal   (+) Fibromyalgia      HEENT   (+) Sinus drainage   (+) Sinus pressure      ID   (+) Postoperative infection   (+) Skin infection       Clinical information reviewed:   Tobacco  Allergies  Meds  Problems  Med Hx  Surg Hx  OB Status    Fam Hx  Soc Hx        NPO Detail:  NPO/Void Status  Carbohydrate Drink Given Prior to Surgery? : N  Date of Last Liquid: 08/04/25  Time of Last Liquid: 2330  Date of Last Solid: 08/04/25  Time of Last Solid: 2200  Last Intake Type: Clear fluids  Time of Last Void: 0830         Physical Exam    Airway  Mallampati: III  TM distance: >3 FB  Neck ROM: full  Mouth opening: 3 or more finger widths  Comments: Small mouth     Cardiovascular - normal exam  Rhythm: regular  Rate: normal     Dental - normal exam     Pulmonary - normal examBreath sounds clear to auscultation     Abdominal - normal examAbdomen: soft  Bowel sounds: normal           Anesthesia Plan    History of general anesthesia?: yes  History of complications of general anesthesia?: no    ASA 3     MAC and general   (MAC or TIVA)  The patient is not a current smoker.  Patient was previously instructed to abstain from smoking on day of procedure.  Patient did not smoke on day of procedure.  Education provided regarding risk of obstructive sleep apnea.  intravenous induction   Anesthetic plan and risks discussed with patient and spouse.  Use of blood products discussed with patient and spouse who consented to blood products.    Plan discussed with CRNA.

## 2025-08-06 DIAGNOSIS — K21.9 GASTROESOPHAGEAL REFLUX DISEASE, UNSPECIFIED WHETHER ESOPHAGITIS PRESENT: Chronic | ICD-10-CM

## 2025-08-08 ENCOUNTER — TELEPHONE (OUTPATIENT)
Dept: SURGERY | Facility: CLINIC | Age: 55
End: 2025-08-08
Payer: MEDICARE

## 2025-08-08 NOTE — TELEPHONE ENCOUNTER
Spoke with patient today about her upcoming EGD and swallow test that needs to be scheduled. Patient is unable to proceed with the swallow test unless she can be sedated. During our phone call patient told me she has not had a BM in about a week. She has taken Miralax for 6 days and I advised her to try Milk of magnesia. Patient states she would try it.I also advised ER. In the meantime I reached out to Dr. Terrazas and he suggested the ER as well. I called  patient back and told her that Dr. Terrazas suggested the ER. Patient told me she had a small BM during our last talk. Patient said she would go to the ER if there were changes with pain or BM stopped again. Patient gave verbal understanding of plan of care. Patient is drinking plenty of fluids and no signs or symptoms of fever, chills, N/V.

## 2025-08-19 ENCOUNTER — ANESTHESIA EVENT (OUTPATIENT)
Dept: GASTROENTEROLOGY | Facility: HOSPITAL | Age: 55
End: 2025-08-19
Payer: MEDICARE

## 2025-08-20 ENCOUNTER — HOSPITAL ENCOUNTER (OUTPATIENT)
Dept: GASTROENTEROLOGY | Facility: HOSPITAL | Age: 55
Discharge: HOME | End: 2025-08-20
Payer: MEDICARE

## 2025-08-20 ENCOUNTER — ANESTHESIA (OUTPATIENT)
Dept: GASTROENTEROLOGY | Facility: HOSPITAL | Age: 55
End: 2025-08-20
Payer: MEDICARE

## 2025-08-20 VITALS
TEMPERATURE: 96.8 F | RESPIRATION RATE: 16 BRPM | WEIGHT: 149 LBS | HEIGHT: 63 IN | DIASTOLIC BLOOD PRESSURE: 73 MMHG | SYSTOLIC BLOOD PRESSURE: 121 MMHG | BODY MASS INDEX: 26.4 KG/M2 | OXYGEN SATURATION: 96 % | HEART RATE: 70 BPM

## 2025-08-20 DIAGNOSIS — K21.9 GASTROESOPHAGEAL REFLUX DISEASE, UNSPECIFIED WHETHER ESOPHAGITIS PRESENT: Primary | ICD-10-CM

## 2025-08-20 PROCEDURE — A43239 PR EDG TRANSORAL BIOPSY SINGLE/MULTIPLE: Performed by: ANESTHESIOLOGY

## 2025-08-20 PROCEDURE — 43239 EGD BIOPSY SINGLE/MULTIPLE: CPT | Performed by: INTERNAL MEDICINE

## 2025-08-20 PROCEDURE — 3700000002 HC GENERAL ANESTHESIA TIME - EACH INCREMENTAL 1 MINUTE

## 2025-08-20 PROCEDURE — 2500000001 HC RX 250 WO HCPCS SELF ADMINISTERED DRUGS (ALT 637 FOR MEDICARE OP): Performed by: INTERNAL MEDICINE

## 2025-08-20 PROCEDURE — 3700000001 HC GENERAL ANESTHESIA TIME - INITIAL BASE CHARGE

## 2025-08-20 PROCEDURE — 2500000004 HC RX 250 GENERAL PHARMACY W/ HCPCS (ALT 636 FOR OP/ED): Performed by: ANESTHESIOLOGIST ASSISTANT

## 2025-08-20 PROCEDURE — A43239 PR EDG TRANSORAL BIOPSY SINGLE/MULTIPLE: Performed by: ANESTHESIOLOGIST ASSISTANT

## 2025-08-20 PROCEDURE — 7100000010 HC PHASE TWO TIME - EACH INCREMENTAL 1 MINUTE

## 2025-08-20 PROCEDURE — 7100000009 HC PHASE TWO TIME - INITIAL BASE CHARGE

## 2025-08-20 RX ORDER — ONDANSETRON HYDROCHLORIDE 2 MG/ML
4 INJECTION, SOLUTION INTRAVENOUS ONCE AS NEEDED
OUTPATIENT
Start: 2025-08-20

## 2025-08-20 RX ORDER — SODIUM CHLORIDE, SODIUM LACTATE, POTASSIUM CHLORIDE, CALCIUM CHLORIDE 600; 310; 30; 20 MG/100ML; MG/100ML; MG/100ML; MG/100ML
100 INJECTION, SOLUTION INTRAVENOUS CONTINUOUS
OUTPATIENT
Start: 2025-08-20 | End: 2025-08-21

## 2025-08-20 RX ORDER — FENTANYL CITRATE 50 UG/ML
INJECTION, SOLUTION INTRAMUSCULAR; INTRAVENOUS AS NEEDED
Status: DISCONTINUED | OUTPATIENT
Start: 2025-08-20 | End: 2025-08-20

## 2025-08-20 RX ORDER — LIDOCAINE HYDROCHLORIDE 10 MG/ML
0.1 INJECTION, SOLUTION INFILTRATION; PERINEURAL ONCE
OUTPATIENT
Start: 2025-08-20 | End: 2025-08-20

## 2025-08-20 RX ORDER — LABETALOL HYDROCHLORIDE 5 MG/ML
5 INJECTION, SOLUTION INTRAVENOUS ONCE AS NEEDED
OUTPATIENT
Start: 2025-08-20

## 2025-08-20 RX ORDER — ACETAMINOPHEN 325 MG/1
975 TABLET ORAL ONCE
OUTPATIENT
Start: 2025-08-20 | End: 2025-08-20

## 2025-08-20 RX ORDER — HYDRALAZINE HYDROCHLORIDE 20 MG/ML
5 INJECTION INTRAMUSCULAR; INTRAVENOUS EVERY 30 MIN PRN
OUTPATIENT
Start: 2025-08-20

## 2025-08-20 RX ORDER — FENTANYL CITRATE 50 UG/ML
12.5 INJECTION, SOLUTION INTRAMUSCULAR; INTRAVENOUS EVERY 5 MIN PRN
Refills: 0 | OUTPATIENT
Start: 2025-08-20

## 2025-08-20 RX ORDER — PROPOFOL 10 MG/ML
INJECTION, EMULSION INTRAVENOUS AS NEEDED
Status: DISCONTINUED | OUTPATIENT
Start: 2025-08-20 | End: 2025-08-20

## 2025-08-20 RX ORDER — DEXTROMETHORPHAN/PSEUDOEPHED 2.5-7.5/.8
DROPS ORAL AS NEEDED
Status: COMPLETED | OUTPATIENT
Start: 2025-08-20 | End: 2025-08-20

## 2025-08-20 RX ORDER — OXYCODONE HYDROCHLORIDE 5 MG/1
5 TABLET ORAL EVERY 4 HOURS PRN
Refills: 0 | OUTPATIENT
Start: 2025-08-20

## 2025-08-20 RX ORDER — HYDROMORPHONE HYDROCHLORIDE 1 MG/ML
1 INJECTION, SOLUTION INTRAMUSCULAR; INTRAVENOUS; SUBCUTANEOUS EVERY 5 MIN PRN
OUTPATIENT
Start: 2025-08-20

## 2025-08-20 RX ORDER — DIPHENHYDRAMINE HYDROCHLORIDE 50 MG/ML
12.5 INJECTION, SOLUTION INTRAMUSCULAR; INTRAVENOUS ONCE AS NEEDED
OUTPATIENT
Start: 2025-08-20

## 2025-08-20 RX ORDER — MIDAZOLAM HYDROCHLORIDE 1 MG/ML
1 INJECTION, SOLUTION INTRAMUSCULAR; INTRAVENOUS ONCE AS NEEDED
OUTPATIENT
Start: 2025-08-20

## 2025-08-20 RX ORDER — TRAZODONE HYDROCHLORIDE 50 MG/1
50 TABLET ORAL NIGHTLY PRN
COMMUNITY

## 2025-08-20 RX ORDER — MEPERIDINE HYDROCHLORIDE 50 MG/ML
12.5 INJECTION INTRAMUSCULAR; INTRAVENOUS; SUBCUTANEOUS EVERY 10 MIN PRN
OUTPATIENT
Start: 2025-08-20

## 2025-08-20 RX ORDER — ALBUTEROL SULFATE 0.83 MG/ML
2.5 SOLUTION RESPIRATORY (INHALATION) ONCE AS NEEDED
OUTPATIENT
Start: 2025-08-20

## 2025-08-20 RX ADMIN — FENTANYL CITRATE 50 MCG: 50 INJECTION, SOLUTION INTRAMUSCULAR; INTRAVENOUS at 08:52

## 2025-08-20 RX ADMIN — PROPOFOL 50 MG: 10 INJECTION, EMULSION INTRAVENOUS at 08:58

## 2025-08-20 RX ADMIN — SIMETHICONE 333 MG: 20 EMULSION ORAL at 08:58

## 2025-08-20 RX ADMIN — PROPOFOL 100 MG: 10 INJECTION, EMULSION INTRAVENOUS at 08:55

## 2025-08-20 ASSESSMENT — PAIN - FUNCTIONAL ASSESSMENT
PAIN_FUNCTIONAL_ASSESSMENT: 0-10

## 2025-08-20 ASSESSMENT — PAIN SCALES - GENERAL
PAIN_LEVEL: 0
PAINLEVEL_OUTOF10: 2
PAINLEVEL_OUTOF10: 0 - NO PAIN
PAINLEVEL_OUTOF10: 0 - NO PAIN

## 2025-08-27 LAB
LABORATORY COMMENT REPORT: NORMAL
PATH REPORT.FINAL DX SPEC: NORMAL
PATH REPORT.GROSS SPEC: NORMAL
PATH REPORT.RELEVANT HX SPEC: NORMAL
PATH REPORT.TOTAL CANCER: NORMAL

## 2025-08-28 LAB — NONINV COLON CA DNA+OCC BLD SCRN STL QL: NEGATIVE

## 2025-09-03 ENCOUNTER — HOSPITAL ENCOUNTER (OUTPATIENT)
Dept: RADIOLOGY | Facility: CLINIC | Age: 55
Discharge: HOME | End: 2025-09-03
Payer: MEDICARE

## 2025-09-03 DIAGNOSIS — N63.20 MASS OF LEFT BREAST, UNSPECIFIED QUADRANT: Primary | ICD-10-CM

## 2025-09-03 DIAGNOSIS — R92.8 ABNORMAL MAMMOGRAM: ICD-10-CM

## 2025-09-03 PROCEDURE — 76642 ULTRASOUND BREAST LIMITED: CPT | Mod: LEFT SIDE | Performed by: STUDENT IN AN ORGANIZED HEALTH CARE EDUCATION/TRAINING PROGRAM

## 2025-09-03 PROCEDURE — 76642 ULTRASOUND BREAST LIMITED: CPT | Mod: LT

## 2025-09-03 PROCEDURE — 77061 BREAST TOMOSYNTHESIS UNI: CPT | Mod: LT

## 2025-09-03 PROCEDURE — 77065 DX MAMMO INCL CAD UNI: CPT | Mod: LEFT SIDE | Performed by: STUDENT IN AN ORGANIZED HEALTH CARE EDUCATION/TRAINING PROGRAM

## 2025-09-03 PROCEDURE — 76982 USE 1ST TARGET LESION: CPT | Mod: LT

## 2025-09-03 PROCEDURE — 77061 BREAST TOMOSYNTHESIS UNI: CPT | Mod: LEFT SIDE | Performed by: STUDENT IN AN ORGANIZED HEALTH CARE EDUCATION/TRAINING PROGRAM

## 2026-01-21 ENCOUNTER — APPOINTMENT (OUTPATIENT)
Dept: PRIMARY CARE | Facility: CLINIC | Age: 56
End: 2026-01-21
Payer: MEDICARE

## (undated) DEVICE — GLOVE SURG SZ 8 L12IN FNGR THK79MIL GRN LTX FREE

## (undated) DEVICE — 3M™ STERI-DRAPE™ U-DRAPE 1015: Brand: STERI-DRAPE™

## (undated) DEVICE — TUBING, SUCTION, 9/32" X 12', STRAIGHT: Brand: MEDLINE INDUSTRIES, INC.

## (undated) DEVICE — DRESSING GZ W1XL8IN COT XRFRM N ADH OVERWRAP CURAD

## (undated) DEVICE — PADDING CAST W6INXL4YD POLY POR SPUN DACRON NON STERILE

## (undated) DEVICE — BANDAGE COMPR M W6INXL10YD WHT BGE VELC E MTRX HK AND LOOP

## (undated) DEVICE — PAD,ABDOMINAL,8"X10",ST,LF: Brand: MEDLINE

## (undated) DEVICE — NEEDLE SPNL 18GA L3.5IN W/ QNCKE SHARPER BVL DURA CLICK

## (undated) DEVICE — SPONGE GZ W4XL4IN COT 12 PLY TYP VII WVN C FLD DSGN STERILE

## (undated) DEVICE — TUBE IRRIG L8IN LNG PT W/ CONN FOR PMP SYS REDEUCE

## (undated) DEVICE — SPONGE,LAP,18"X18",DLX,XR,ST,5/PK,40/PK: Brand: MEDLINE

## (undated) DEVICE — LABEL MED MINI W/ MARKER

## (undated) DEVICE — BLADE,CARBON-STEEL,11,STRL,DISPOSABLE,TB: Brand: MEDLINE

## (undated) DEVICE — TOWEL,OR,DSP,ST,BLUE,STD,4/PK,20PK/CS: Brand: MEDLINE

## (undated) DEVICE — GOWN,SIRUS,POLYRNF,BRTHSLV,XLN/XL,20/CS: Brand: MEDLINE

## (undated) DEVICE — BANDAGE COBAN 4 IN COMPR W4INXL5YD FOAM COHESIVE QUIK STK SELF ADH SFT

## (undated) DEVICE — BANDAGE,ELASTIC,ESMARK,STERILE,6"X9',LF: Brand: MEDLINE

## (undated) DEVICE — HYPODERMIC SAFETY NEEDLE: Brand: MAGELLAN

## (undated) DEVICE — CANNULA ARTHSCP L7CM ID575MM CRYS SMOOTH W OBT

## (undated) DEVICE — ALCOHOL RUBBING ISO 16OZ 70%

## (undated) DEVICE — MARKER SURG SKIN GENTIAN VLT REG TIP W/ 6IN RUL DYNJSM01

## (undated) DEVICE — APPLICATOR MEDICATED 26 CC SOLUTION HI LT ORNG CHLORAPREP

## (undated) DEVICE — 4-PORT MANIFOLD: Brand: NEPTUNE 2

## (undated) DEVICE — SYRINGE MED 30ML STD CLR PLAS LUERLOCK TIP N CTRL DISP

## (undated) DEVICE — GLOVE ORANGE PI 7 1/2   MSG9075

## (undated) DEVICE — COUNTER NDL 40 COUNT HLD 70 FOAM BLK ADH W/ MAG

## (undated) DEVICE — SUTURE ETHILON SZ 4-0 L18IN NONABSORBABLE BLK L19MM PS-2 3/8 1667H

## (undated) DEVICE — PACK,ARTHROSCOPY II,SIRUS: Brand: MEDLINE

## (undated) DEVICE — BLADE SHAVER AGGRESSIVE + 4 MM RAZOR SHRP TOOTH RED SYNOVIUM

## (undated) DEVICE — GOWN,AURORA,NONREINFORCED,LARGE: Brand: MEDLINE